# Patient Record
Sex: MALE | Race: BLACK OR AFRICAN AMERICAN | NOT HISPANIC OR LATINO | Employment: FULL TIME | URBAN - METROPOLITAN AREA
[De-identification: names, ages, dates, MRNs, and addresses within clinical notes are randomized per-mention and may not be internally consistent; named-entity substitution may affect disease eponyms.]

---

## 2018-07-18 ENCOUNTER — HOSPITAL ENCOUNTER (EMERGENCY)
Facility: OTHER | Age: 63
Discharge: HOME | End: 2018-07-18
Attending: EMERGENCY MEDICINE
Payer: MEDICAID

## 2018-07-18 VITALS
HEART RATE: 72 BPM | OXYGEN SATURATION: 99 % | SYSTOLIC BLOOD PRESSURE: 129 MMHG | WEIGHT: 152 LBS | HEIGHT: 71 IN | TEMPERATURE: 98 F | RESPIRATION RATE: 18 BRPM | DIASTOLIC BLOOD PRESSURE: 76 MMHG | BODY MASS INDEX: 21.28 KG/M2

## 2018-07-18 DIAGNOSIS — S29.019A THORACIC MYOFASCIAL STRAIN, INITIAL ENCOUNTER: Primary | ICD-10-CM

## 2018-07-18 DIAGNOSIS — R10.9 RIGHT FLANK PAIN: ICD-10-CM

## 2018-07-18 DIAGNOSIS — R25.8 CLONUS: ICD-10-CM

## 2018-07-18 DIAGNOSIS — M25.551 HIP PAIN, RIGHT: ICD-10-CM

## 2018-07-18 LAB
ALBUMIN SERPL BCP-MCNC: 3.9 G/DL
ALP SERPL-CCNC: 48 U/L
ALT SERPL W/O P-5'-P-CCNC: 10 U/L
ANION GAP SERPL CALC-SCNC: 5 MMOL/L
AST SERPL-CCNC: 14 U/L
BASOPHILS # BLD AUTO: 0.02 K/UL
BASOPHILS NFR BLD: 0.4 %
BILIRUB SERPL-MCNC: 0.7 MG/DL
BILIRUB UR QL STRIP: NEGATIVE
BUN SERPL-MCNC: 12 MG/DL
CALCIUM SERPL-MCNC: 9.4 MG/DL
CHLORIDE SERPL-SCNC: 105 MMOL/L
CLARITY UR: CLEAR
CO2 SERPL-SCNC: 28 MMOL/L
COLOR UR: YELLOW
CREAT SERPL-MCNC: 1.1 MG/DL
DIFFERENTIAL METHOD: ABNORMAL
EOSINOPHIL # BLD AUTO: 0.1 K/UL
EOSINOPHIL NFR BLD: 2.3 %
ERYTHROCYTE [DISTWIDTH] IN BLOOD BY AUTOMATED COUNT: 13 %
EST. GFR  (AFRICAN AMERICAN): >60 ML/MIN/1.73 M^2
EST. GFR  (NON AFRICAN AMERICAN): >60 ML/MIN/1.73 M^2
GLUCOSE SERPL-MCNC: 93 MG/DL
GLUCOSE UR QL STRIP: NEGATIVE
HCT VFR BLD AUTO: 37.7 %
HGB BLD-MCNC: 12 G/DL
HGB UR QL STRIP: NEGATIVE
KETONES UR QL STRIP: NEGATIVE
LEUKOCYTE ESTERASE UR QL STRIP: NEGATIVE
LYMPHOCYTES # BLD AUTO: 1.9 K/UL
LYMPHOCYTES NFR BLD: 36.6 %
MAGNESIUM SERPL-MCNC: 2.2 MG/DL
MCH RBC QN AUTO: 29.2 PG
MCHC RBC AUTO-ENTMCNC: 31.8 G/DL
MCV RBC AUTO: 92 FL
MONOCYTES # BLD AUTO: 0.7 K/UL
MONOCYTES NFR BLD: 13.1 %
NEUTROPHILS # BLD AUTO: 2.5 K/UL
NEUTROPHILS NFR BLD: 47.4 %
NITRITE UR QL STRIP: NEGATIVE
PH UR STRIP: 6 [PH] (ref 5–8)
PHOSPHATE SERPL-MCNC: 3.4 MG/DL
PLATELET # BLD AUTO: 223 K/UL
PMV BLD AUTO: 8.7 FL
POTASSIUM SERPL-SCNC: 4.3 MMOL/L
PROT SERPL-MCNC: 7.5 G/DL
PROT UR QL STRIP: NEGATIVE
RBC # BLD AUTO: 4.11 M/UL
SODIUM SERPL-SCNC: 138 MMOL/L
SP GR UR STRIP: 1.02 (ref 1–1.03)
URN SPEC COLLECT METH UR: NORMAL
UROBILINOGEN UR STRIP-ACNC: 1 EU/DL
WBC # BLD AUTO: 5.27 K/UL

## 2018-07-18 PROCEDURE — A9585 GADOBUTROL INJECTION: HCPCS | Performed by: EMERGENCY MEDICINE

## 2018-07-18 PROCEDURE — 25500020 PHARM REV CODE 255: Performed by: EMERGENCY MEDICINE

## 2018-07-18 PROCEDURE — 80053 COMPREHEN METABOLIC PANEL: CPT

## 2018-07-18 PROCEDURE — 83735 ASSAY OF MAGNESIUM: CPT

## 2018-07-18 PROCEDURE — 99284 EMERGENCY DEPT VISIT MOD MDM: CPT | Mod: 25

## 2018-07-18 PROCEDURE — 85025 COMPLETE CBC W/AUTO DIFF WBC: CPT

## 2018-07-18 PROCEDURE — 81003 URINALYSIS AUTO W/O SCOPE: CPT

## 2018-07-18 PROCEDURE — 84100 ASSAY OF PHOSPHORUS: CPT

## 2018-07-18 RX ORDER — METHOCARBAMOL 500 MG/1
1000 TABLET, FILM COATED ORAL 3 TIMES DAILY PRN
Qty: 30 TABLET | Refills: 0 | Status: SHIPPED | OUTPATIENT
Start: 2018-07-18 | End: 2018-07-23

## 2018-07-18 RX ORDER — GADOBUTROL 604.72 MG/ML
7 INJECTION INTRAVENOUS
Status: COMPLETED | OUTPATIENT
Start: 2018-07-18 | End: 2018-07-18

## 2018-07-18 RX ORDER — IBUPROFEN 600 MG/1
600 TABLET ORAL EVERY 6 HOURS PRN
Qty: 20 TABLET | Refills: 0 | Status: SHIPPED | OUTPATIENT
Start: 2018-07-18 | End: 2020-07-27 | Stop reason: SDUPTHER

## 2018-07-18 RX ADMIN — GADOBUTROL 7 ML: 604.72 INJECTION INTRAVENOUS at 12:07

## 2018-07-18 NOTE — ED NOTES
Lab called, per Adeline, reports urine must be recollected due to contamination from urine in bag.

## 2018-07-18 NOTE — ED NOTES
Pt presents to ED with complaints of R sided pain x1 week. Pt reports playing basketball when pain began. Pt reports pain at 9/10, worsens with movement, reports radiation to R leg. Pt also reports urinary frequency, denies any pain while voiding, foul-smelling urine. AAOx4, RR even and unlabored. Will continue to monitor.

## 2018-07-18 NOTE — ED PROVIDER NOTES
"Encounter Date: 7/18/2018    SCRIBE #1 NOTE: I, Roxi Wynne, am scribing for, and in the presence of, Dr. Hargrove.       History     Chief Complaint   Patient presents with    Flank Pain     Pt reports R sided pain x1 week. Pt reports pain starting after playing basketball. Pain worsens with movement. Pt denies any urinary difficulty.      Time seen by provider: 10:08 AM    This is a 62 y.o. male who presents with complaint of right flank pain that began while playing basketball one week ago. He denies fall or trauma. He additionally reports intermittent urinary incontinence, right arm pain, "sharp" 8-9/10 right leg pain, and right leg shaking and weakness that began five days ago. He denies fever, SOB, chest pain, abdominal pain, N/V/D, dysuria, or penile or numbness. He has taken Advil and Aleve with no relief. He reports occasional use of alcohol on the weekend and use of marijuana, but denies use of illicit drugs.      The history is provided by the patient.     Review of patient's allergies indicates:  No Known Allergies  Past Medical History:   Diagnosis Date    Pneumonia      Past Surgical History:   Procedure Laterality Date    KNEE ARTHROSCOPY       No family history on file.  Social History   Substance Use Topics    Smoking status: Current Every Day Smoker     Packs/day: 2.00     Types: Cigarettes    Smokeless tobacco: Never Used    Alcohol use Yes      Comment: Socially     Review of Systems   Constitutional: Negative for chills and fever.   HENT: Negative for congestion and sore throat.    Eyes: Negative for visual disturbance.   Respiratory: Negative for cough and shortness of breath.    Cardiovascular: Negative for chest pain and palpitations.   Gastrointestinal: Negative for abdominal pain, diarrhea, nausea and vomiting.   Genitourinary: Positive for flank pain. Negative for decreased urine volume, dysuria, frequency, penile pain, penile swelling, scrotal swelling and testicular pain.        " Negative for penile numbness.  Positive for urinary incontinence.   Musculoskeletal: Negative for back pain, joint swelling, myalgias, neck pain and neck stiffness.        Positive for arm and leg pain.   Skin: Negative for color change, rash and wound.   Neurological: Positive for tremors and weakness. Negative for dizziness, light-headedness, numbness and headaches.   Psychiatric/Behavioral: Negative for behavioral problems and confusion.       Physical Exam     Initial Vitals [07/18/18 0909]   BP Pulse Resp Temp SpO2   127/78 78 18 97.6 °F (36.4 °C) 99 %      MAP       --         Physical Exam    Nursing note and vitals reviewed.  Constitutional: He appears well-developed and well-nourished. He is not diaphoretic. No distress.   HENT:   Head: Normocephalic and atraumatic.   Mouth/Throat: Oropharynx is clear and moist. No oropharyngeal exudate, posterior oropharyngeal edema or posterior oropharyngeal erythema.   Eyes: Conjunctivae and EOM are normal. Pupils are equal, round, and reactive to light. No scleral icterus.   Neck: Normal range of motion. Neck supple.   Cardiovascular: Normal rate, regular rhythm and normal heart sounds. Exam reveals no gallop and no friction rub.    No murmur heard.  Pulmonary/Chest: No respiratory distress. He has no wheezes. He has no rhonchi. He has no rales.   Breath sounds are distant, but normal.   Abdominal: Soft. Bowel sounds are normal. He exhibits no distension. There is no tenderness. There is no rebound and no guarding.   Musculoskeletal: Normal range of motion. He exhibits no edema or tenderness.   Tenderness to palpation right CVA area.  Painful range of motion with external rotation of right hip.   Neurological: He is alert and oriented to person, place, and time. He has normal strength. No cranial nerve deficit or sensory deficit.   2 beats of clonus on right ankle. None on the left ankle.   Skin: Skin is warm and dry. No rash noted. No pallor.   Psychiatric: He has a  normal mood and affect. Thought content normal.         ED Course   Procedures  Labs Reviewed   CBC W/ AUTO DIFFERENTIAL - Abnormal; Notable for the following:        Result Value    RBC 4.11 (*)     Hemoglobin 12.0 (*)     Hematocrit 37.7 (*)     MCHC 31.8 (*)     MPV 8.7 (*)     All other components within normal limits   COMPREHENSIVE METABOLIC PANEL - Abnormal; Notable for the following:     Alkaline Phosphatase 48 (*)     Anion Gap 5 (*)     All other components within normal limits   URINALYSIS   MAGNESIUM   PHOSPHORUS          Imaging Results          X-Ray Hip 2 View Right (Final result)  Result time 07/18/18 13:47:23    Final result by Sy Blount MD (07/18/18 13:47:23)                 Impression:      No fracture or acute abnormality.  Minimal DJD of the hips.      Electronically signed by: Sy Blount MD  Date:    07/18/2018  Time:    13:47             Narrative:    EXAMINATION:  XR HIP 2 VIEW RIGHT    CLINICAL HISTORY:  Pain in right hip    TECHNIQUE:  AP view of the pelvis and frog leg lateral view of the right hip were performed.    COMPARISON:  None    FINDINGS:  The skeletal structures are intact.  No fracture or dislocation is identified.  Hip joint spaces are minimally narrowed.  Accessory ossicles are noted along each acetabulum.    Visualized abdomen shows prominent bowel gas without abnormal distention.                               X-Ray Chest PA And Lateral (Final result)  Result time 07/18/18 13:45:50    Final result by Sy Blount MD (07/18/18 13:45:50)                 Impression:      No acute cardiopulmonary disease.  Possible emphysema.      Electronically signed by: Sy Blount MD  Date:    07/18/2018  Time:    13:45             Narrative:    EXAMINATION:  XR CHEST PA AND LATERAL    CLINICAL HISTORY:  Unspecified abdominal pain    TECHNIQUE:  PA and lateral views of the chest were performed.    COMPARISON:  None    FINDINGS:  The heart size and mediastinal  contour are normal.  Lungs are well expanded possibly with emphysema.  Lungs are clear without acute consolidation or pleural fluid.  Skeletal structures are intact.  The visualized upper abdomen is unremarkable.                               MRI Lumbar Spine W WO Cont (Final result)  Result time 07/18/18 13:23:44   Procedure changed from MRI Lumbar Spine With Contrast     Final result by Mu Carvajal DO (07/18/18 13:23:44)                 Impression:      Multilevel degenerative change lumbar spine most pronounced at L 3/L4 with small posterior disc osteophyte complex with superimposed left paracentral disc protrusion with annular fissure.  No significant central canal stenosis with superimposed facet joint arthropathy with moderate bilateral neural foraminal stenosis.    Please see above for additional details.      Electronically signed by: Mu Carvajal DO  Date:    07/18/2018  Time:    13:23             Narrative:    EXAMINATION:  MRI LUMBAR SPINE W WO CONTRAST    CLINICAL HISTORY:  Low back pain, cauda equina syndrome suspected; Pain in right hip    TECHNIQUE:  Sagittal T1, T2, stir and axial T1-T2 imaging of the lumbar spine without contrast.  In addition axial T1 and sagittal fat sat T1 postcontrast imaging of the lumbar spine following 7 mL Gadavist intravenous infusion of contrast    COMPARISON:  None.    FINDINGS:  There is straightening of the normal lumbar lordosis.  The lumbar vertebral body heights, contours and bone marrow signal is within normal limits without evidence for acute fracture or subluxation.    .    The distal spinal cord and conus is normal in signal and contour tip of the conus approximates the T12/L1 level.    No aneurysmal dilatation of the visualized abdominal aorta.    T12/L1 through L1/L2: No significant disc bulge, central canal or neural foraminal stenosis.    L2/L3: No significant disc bulge central canal or neural foraminal stenosis.    L3/L4:Posterior disc osteophyte with  superimposed left paracentral disc protrusion with annular fissure.  Mass effect on the ventral thecal sac without significant central canal stenosis.  Superimposed facet joint arthropathy with moderate bilateral neural foraminal stenosis.    L4/L5:Posterior disc osteophyte with ligamentum flavum hypertrophy and facet joint arthropathy without significant central canal stenosis with mild moderate bilateral neural foraminal stenosis.    L5/S1: Posterior disc osteophyte without significant central canal stenosis with superimposed facet joint arthropathy with mild neural foraminal stenosis bilaterally    No abnormal intrathecal enhancement.                              X-Rays:   Independently Interpreted Readings:   Chest X-Ray: No infiltrate, effusion, or pneumothorax.   Other Readings:  Right Hip: Degenerative changes present without fracture or dislocation.    Medical Decision Making:   Clinical Tests:   Lab Tests: Ordered and Reviewed  Radiological Study: Ordered and Reviewed  ED Management:  Emergent evaluation of 62-year-old male with complaint of right flank pain since playing basketball, this is reproducible on exam.  Urinalysis does not show any pyelonephritis and chest x-ray shows no cause for his pain.  I suspect this is musculoskeletal.  However, on review of systems he had other concerning symptoms including some occasional urinary incontinence and hip weakness. On exam he had clonus in the right foot.  MRI was performed which does not show any cauda equina or cord compression, there is facet arthropathy and some foraminal stenosis.  Blood tests are benign and I am comfortable with discharge home.  He is discharged with prescriptions for ibuprofen and Robaxin, and encouraged close follow-up with PCP.  He does not currently have a PCP and is referred to the [facility].            Scribe Attestation:   Scribe #1: I performed the above scribed service and the documentation accurately describes the  services I performed. I attest to the accuracy of the note.    Attending Attestation:           Physician Attestation for Scribe:  Physician Attestation Statement for Scribe #1: I, Dr. Hargrove, reviewed documentation, as scribed by Roxi Wynne in my presence, and it is both accurate and complete.                    Clinical Impression:     1. Thoracic myofascial strain, initial encounter    2. Clonus    3. Hip pain, right    4. Right flank pain                                 Chelsey Hargrove MD  07/19/18 7055

## 2018-08-15 DIAGNOSIS — M54.41 LUMBAGO WITH SCIATICA, RIGHT SIDE: Primary | ICD-10-CM

## 2018-09-05 ENCOUNTER — CLINICAL SUPPORT (OUTPATIENT)
Dept: REHABILITATION | Facility: OTHER | Age: 63
End: 2018-09-05
Payer: MEDICAID

## 2018-09-05 DIAGNOSIS — R53.1 DECREASED STRENGTH, ENDURANCE, AND MOBILITY: ICD-10-CM

## 2018-09-05 DIAGNOSIS — R68.89 DECREASED STRENGTH, ENDURANCE, AND MOBILITY: ICD-10-CM

## 2018-09-05 DIAGNOSIS — M54.41 ACUTE RIGHT-SIDED LOW BACK PAIN WITH RIGHT-SIDED SCIATICA: ICD-10-CM

## 2018-09-05 DIAGNOSIS — M62.89 MUSCLE TIGHTNESS: ICD-10-CM

## 2018-09-05 DIAGNOSIS — Z74.09 DECREASED STRENGTH, ENDURANCE, AND MOBILITY: ICD-10-CM

## 2018-09-05 PROCEDURE — 97161 PT EVAL LOW COMPLEX 20 MIN: CPT | Mod: PN

## 2018-09-05 NOTE — PLAN OF CARE
"  TIME RECORD    Date: 09/05/2018    Start Time:  11:50  Stop Time:  12:50  Total Timed Minutes:  60 min    OUTPATIENT PHYSICAL THERAPY   PATIENT EVALUATION  Onset Date: July 2018  Primary Diagnosis:   1. Acute right-sided low back pain with right-sided sciatica     2. Muscle tightness     3. Decreased strength, endurance, and mobility       Treatment Diagnosis: Low back and RLE pain, decreased ROM, flexibility, strength, poor postural awareness/endurance, decreased NM control/coordination  Past Medical History:   Diagnosis Date    Pneumonia      Precautions: Hx R knee scope. Hx smoking/alcohol use  Prior Therapy: none for c/c  Medications: Linden Spencer has a current medication list which includes the following prescription(s): ibuprofen.  Nutrition:  Thin  History of Present Illness: acute onset  Prior Level of Function: Independent  Social History: works at [employer] - rides bike. 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 Recreation -basketball  Place of Residence (Steps/Adaptations): lives alone. 4 steps to front door, [facility]  Functional Deficits Leading to Referral/Nature of Injury: pain and difficulty with ADLs, HHCs, work-related and recreational activites  Patient Therapy Goals: "reduce pain, get back to playing basketball and being able to work without limping"    Subjective     Linden Spencer states that he was playing basketball with his grandchildren in July 2018 when he felt a pull in the R low back. He says that since then he has had R back pain as well as pain down the posterior-lateral R LE (described as numbness/tingling). He says that his symptoms are staying the same since onset. Has tried medication but notes Min change.    Pain:  Location: R low back, RLE  Description: Aching, Dull, Tingling and Numb  Activities Which Increase Pain: Sitting, Bending, Walking, Night Time, Getting out of bed/chair and disrupts sleep, ADLs, HHCs, " "work-related activities, recreational activites  Activities Which Decrease Pain: alleve, change positions  Pain Scale: 4/10 at best 4/10 now  8/10 at worst    Objective     Palpation: increased tone/tenderness to the R LSP paraspinals, QL, glute max/piriformis. glute atrophy noted Norm  Postural examination/scapula alignment: Affected scapula elevated and Decreased lordosis  Sensory deficit: none  Reflexes: intact    Thoracic/Lumbar AROM: Pain/Dysfunction with Movement:   Flexion Min garrido, endrange stiffness in R LB/HS  Repeated standing: improved symptoms   Extension Mod garrido, endrange pain  Repeated standing: mild worsening   Right side bending Min garrido, no pain   Left side bending Min-Mod garrido, pain in R trunk   Right rotation Standing - WFL, no pain  Sitting - min garrido, no pain   Left rotation Standing - WFL, no pain  Sitting - min garrido, no pain     Hip ROM: WFL. MOD Limited IR/ER of R hip  Knee ROM: WNL  Lower Extremity Strength  Right LE  Left LE    Hip flexion: 4/5 Hip flexion: 4+/5   Hip extension:  4/5 Hip extension: 4+/5   Hip abduction: 4/5 Hip abduction: 4/5   Hip adduction:  5/5 Hip adduction:  5/5   Hip Internal rotation   4+/5 Hip Internal rotation 4+/5   Knee Flexion 5/5 Knee Flexion 5/5   Knee Extension 5/5 Knee Extension 5/5   Ankle dorsiflexion: 5/5 Ankle dorsiflexion: 5/5   Ankle plantarflexion: 5/5 Ankle plantarflexion: 5/5     Flexibility:   Hamstrings: R = mod garrido, L = Min garrido  Piriformis: R = mod garrido, L = Min garrido  Hip flexors / Quads = WFL  Joint Mobility: NT today. Assess next visit    Special Tests:   Test Name  Test Result   Prone Instability Test NT   Lumbar Quadrant test (--)   Straight Leg Raise (--)   Neural Tension Test (Slump) (+)   Crossed Straight Leg Raise (--)   Walking on toes (--)   Walking on heels  (--)   Clonus (--)   NATHAN (--)   FADIR (+)    SI Joint Provocation Test (compression / distraction) NT   Balance: SLS R = 10" with increased sway and ipsilat trunk lean  L = 10" w/ Min " "sway     Gait: Without AD  Analysis: Assistance - none. Antalgic with decreased WB'ing through the RLE  Bed Mobility:Independent  Transfers: Independent    Other: FOTO limitation = 44% disability  Examination time: 20 min  Treatment:  LTR 15x  SKTC: 3 x 20"  AHSS: 15x  Figure 4 piriformis stretch: 3 x 20"  SLR: 2 x10  Bridge with band: 2 x 10 x Yellow TB  Clams: 2 x 10 x Yellow TB    Patient education: Patient educated regarding pathogenesis, diagnosis, protocol, prognosis, POC, and HEP. Written Home Exercises Provided with written and verbal instructions for frequency and duration of the following exercises: see clinical reference tab for patient instructions. Yellow theraband provided today. Pt educated on HEP and activity modifications to reduce c/o pain and improve overall function. Pt was educated in posture and body mechanics. Pt also educated on use of modalities prn to reduce c/o pain and dysfunction. Patient demo good understanding of the education provided. Patient demo good return demo of skill of exercises.    Assessment     Initial Assessment (Pertinent finding, problem list and factors affecting outcome): Patient presents with right low back and leg pain with reduced ROM, flexibility, and strength, with s/s associated with muscle strain. Current impairments limits patient with all functional activities. Patient requires skilled PT to address remaining deficits and return patient to OF. Pt has set realistic goals and has verbalized good understanding and agreement with reported diagnosis, prognosis and treatment. Pt demonstrates no additional cultural, spiritual or educational need and currently has no barriers to learning.     Rehab Potiential: good     Medical necessity is demonstrated by the following problem list.  Pt presents with the following impairments:     History  Co-morbidities and personal factors that may impact the plan of care Examination  Body Structures and Functions, activity " limitations and participation restrictions that may impact the plan of care Clinical Presentation   Decision Making/ Complexity Score     Co-morbidities:   advanced age, education level, excessive commute time/distance, financial considerations and Hx R knee scope. Hx smoking/alcohol use    Hx R knee scope. Hx smoking/alcohol use            Personal Factors:   age  education level  coping style  social background  lifestyle Body Regions: trunk, back, BLE    Body Systems: Musculoskeletal (ROM, strength, symmetry, joint mobility, soft tissue or myofascial mobility, flexibility), Neuromuscular (postural alignment, body mechanics, balance, gait, transfers, motor control, motor learning), cardiovascular (endurance), Integumentary (skin integrity)    Activity limitations:   Learning and applying knowledge  no deficits    General Tasks and Commands  no deficits    Communication  no deficits    Mobility  lifting and carrying objects  walking  moving around using equipment (WC)  driving (bike, car, motorcycle)    Self care  toileting  dressing  looking after one's health    Domestic Life  shopping  cooking  doing house work (cleaning house, washing dishes, laundry)  assisting others    Interactions/Relationships  no deficits    Life Areas  employment    Community and Social Life  community life  recreation and leisure  Christianity and spirituality      Participation Restrictions:   Sitting, Bending, Walking, Night Time, Getting out of bed/chair and disrupts sleep, ADLs, HHCs, work-related activities, recreational activites       Stable and uncomplicated         Low        FOTO: 44% disability     Short Term Goals (4 Weeks):   1. Pt will report 20% reduction in pain of the lumbar spine and LE for ease with ADL's  2. PT will demonstrate improved trunk strength by a half grade in for ease with upright posture during standing activities.  3. Pt will demonstrate improved lumbar spine ROM in all directions by a half grade for ease  with bending activities.   4. Pt to demonstrate improved functional ability with FOTO limitation <=34% disability.    Long Term Goals (8 Weeks):   1. Pt will report being independent with HEP for maintenance of improvements gained during therapy sessions  2. PT will report 50% reduction of pain of the back and LE for ease with dressing and grooming activities.   3. Pt will demonstrate trunk and extremity strength to >=4+/5 without the provocation of pain for ease with household chores  4. Pt will demonstrate appropriate upright posture without external cueing for ease with work related activities.   5. Pt to demonstrate improved functional ability with FOTO limitation <=24% disability.      Plan     Certification Period: 9/5/18 to 12/5/18  Recommended Treatment Plan: 1-2 times per week for 6-8 weeks, pending pt progress: Aquatic Therapy, Cervical/Lumbar Traction, Electrical Stimulation prn, Gait Training, Iontophoresis (with dexamethasone prn), Manual Therapy, Moist Heat/ Ice, Neuromuscular Re-ed, Patient Education, Self Care, Therapeutic Activites, Therapeutic Exercise and IASTYM, therapeutic taping, dry needling  Other Recommendations: Progress HEP towards D/C. Recommend F/U with MD if symptoms worsen or do not resolve. Patient may be seen by a PTA for treatment to carry out their plan of care.  Face-to-face conferences will be held.          Therapist: Asha Mascorro, PT    I CERTIFY THE NEED FOR THESE SERVICES FURNISHED UNDER THIS PLAN OF TREATMENT AND WHILE UNDER MY CARE    Physician's comments: ________________________________________________________________________________________________________________________________________________      Physician's Name: ___________________________________

## 2018-09-17 ENCOUNTER — CLINICAL SUPPORT (OUTPATIENT)
Dept: REHABILITATION | Facility: OTHER | Age: 63
End: 2018-09-17
Payer: MEDICAID

## 2018-09-17 DIAGNOSIS — M54.41 ACUTE RIGHT-SIDED LOW BACK PAIN WITH RIGHT-SIDED SCIATICA: ICD-10-CM

## 2018-09-17 DIAGNOSIS — Z74.09 DECREASED STRENGTH, ENDURANCE, AND MOBILITY: ICD-10-CM

## 2018-09-17 DIAGNOSIS — R53.1 DECREASED STRENGTH, ENDURANCE, AND MOBILITY: ICD-10-CM

## 2018-09-17 DIAGNOSIS — M62.89 MUSCLE TIGHTNESS: ICD-10-CM

## 2018-09-17 DIAGNOSIS — R68.89 DECREASED STRENGTH, ENDURANCE, AND MOBILITY: ICD-10-CM

## 2018-09-17 PROCEDURE — 97110 THERAPEUTIC EXERCISES: CPT | Mod: PN

## 2018-09-17 NOTE — PROGRESS NOTES
"                            Physical Therapy Daily Treatment Note     Name: Cheri Sepncer  Clinic Number: [id number]    Therapy Diagnosis:   Encounter Diagnoses   Name Primary?    Acute right-sided low back pain with right-sided sciatica     Muscle tightness     Decreased strength, endurance, and mobility      Physician: Martínez Gibbs MD    Visit Date: 9/17/2018  Physician Orders: PT eval and treat  Medical Diagnosis: M54.41 (ICD-10-CM) - Lumbago with sciatica, right side  Evaluation Date: 9/5/18  Authorization Period Expiration: 12/31/18  Plan of Care Certification Period: 12/5/18  Visit #/Visits authorized: 2/ 20 (based on medical necessity)    Time In: 4:55 pm  Time Out: 5:58 pm  Total Billable Time: 53 minutes    Precautions: Hx R knee scope. Hx smoking/alcohol use    Subjective   Pt reports today is a "rough day."  He was not compliant with home exercise program. Has not performed HEP the last 2 days.  Response to previous treatment: felt better after last session  Functional change: none noted to date    Pain: 8/10  Location: back  bilateral    Objective   Arrived to therapy in pelvic dysfunction. R anterior rotated innominate, L posterior rotated innominate      CHERI received therapeutic exercises to develop strength, ROM, flexibility, posture and core stabilization for 53 minutes including:  Push/pull 3 x 3"  Supine active hamstring stretch 10 x 10"  Piriformis stretch 10 x 10"  Gluteal set 10 x 10"  Bridging 10 x 10"  Side lying clams 10 x 10"  Posterior pelvic tilt 10 x 10"  LTR 10 x 10"  Prone hip extension 10 x 10"    Second trial push pull 3 x 3"      Home Exercises Provided and Patient Education Provided     Education provided:   Push/pull  Gluteal sets  Side lying clams  Side lying hip abd   Posterior pelvic tilts  Prone hip extension      Written Home Exercises Provided: yes.  Exercises were reviewed and CHERI was able to demonstrate them prior to the end of the session.  CHERI demonstrated " good  understanding of the education provided.     See EMR under Patient Instructions for exercises provided 9/17/2018.    Assessment   Arrived to therapy in pelvic dysfunction. Push/pull able to correct alignment. Performed exercises and remained in proper pelvic alignment. Added to HEP.    CHERI is progressing well towards his goals.   Pt prognosis is Good.     Pt will continue to benefit from skilled outpatient physical therapy to address the deficits listed in the problem list box on initial evaluation, provide pt/family education and to maximize pt's level of independence in the home and community environment.     Pt's spiritual, cultural and educational needs considered and pt agreeable to plan of care and goals.    Anticipated barriers to physical therapy: none    Goals:   Short Term Goals (4 Weeks):   1. Pt will report 20% reduction in pain of the lumbar spine and LE for ease with ADL's  2. PT will demonstrate improved trunk strength by a half grade in for ease with upright posture during standing activities.  3. Pt will demonstrate improved lumbar spine ROM in all directions by a half grade for ease with bending activities.   4. Pt to demonstrate improved functional ability with FOTO limitation <=34% disability.     Long Term Goals (8 Weeks):   1. Pt will report being independent with HEP for maintenance of improvements gained during therapy sessions  2. PT will report 50% reduction of pain of the back and LE for ease with dressing and grooming activities.   3. Pt will demonstrate trunk and extremity strength to >=4+/5 without the provocation of pain for ease with household chores  4. Pt will demonstrate appropriate upright posture without external cueing for ease with work related activities.   5. Pt to demonstrate improved functional ability with FOTO limitation <=24% disability.      Plan     Continue with pelvic stabilization exercises and progress as tolerated by patient.    Jann Kirkland, PT

## 2018-09-17 NOTE — PATIENT INSTRUCTIONS
"SI joint Muscle energy for L Posterior/R anterior rotation      Copyright © 2177-4544 [employer]        Bring both knees up towards your chest as shown in the picture.  Place your Left hand on top of your Left thigh, and your Right hand on the back of your Right thigh.  Push your legs into each hand with about 50% effort.  Perform 3 reps and hold for 3 seconds. Perform twice a day.    Supine Active Hamstring Stretch        Grasp behind knee and keep leg at arms length. Extend leg up until a gentle stretch is found behind your knee.     The opposite knee can be bent or straight at your therapists discretion. Perform 10 reps holding for 10 seconds. Perform twice a day.    Copyright © 1714-3416 [employer]      Hip External Rotation - Piriformis Stretching            - Lie on your back, one knee bent and the other straight  - Grab the bent leg behind the knee with the opposite arm and pull the bent knee towards your opposite armpit  - The stretch may be felt in the outside buttock region or relatively deep in the pelvis.    The opposite knee can be bent or straight at your therapists discretion. Perform 10 reps holding for 10 seconds.    Perform twice a day.    Copyright © 6427-8536 [employer]      GLUTE SET - SUPINE        While lying on your back, squeeze your buttocks and hold. Repeat. The opposite knee can be bent or straight at your therapists discretion. Perform 10 reps holding for 10 seconds.    Perform twice a day.    Copyright © 7470-6257 [employer]    BRIDGING        While lying on your back, tighten your lower abdominals, squeeze your buttocks and then raise your buttocks off the floor/bed as creating a "Bridge" with your body. Hold and then lower yourself and repeat. Perform 10 reps holding for 10 seconds.    Perform twice a day.    Copyright © 5293-1414 [employer]      CLAM SHELLS      While lying on your side with your knees bent, draw up the top knee while keeping contact of your feet together.    Do " not let your pelvis roll back during the lifting movement.    Hold and then lower yourself and repeat. Perform 10 reps holding for 10 seconds.    Perform twice a day.    Copyright © 0441-9539 [employer]    HIP ABDUCTION - SIDELYING            While lying on your side, slowly raise up your top leg to the side. Keep your knee straight and maintain your toes pointed forward the entire time. Keep your leg in-line with your body.  The bottom leg can be bent to stabilize your body.    Hold and then lower yourself and repeat. Perform 10 reps holding for 10 seconds.    Copyright © 6442-3309 [employer]    POSTERIOR PELVIC TILT        Lie on your back on a firm surface with knees comfortably bent (top picture).  Then flatten back against the table while stas abdominal muscles as if pulling belly button toward ribs (bottom picture). Perform  10  Reps, hold for  10  Seconds, perform 2  times a day.    Copyright © 0141-5559 [employer]      LOWER TRUNK ROTATIONS - LTR          Lying on your back with your knees bent, gently move your knees side-to-side. Perform 10  Reps, hold for  10  Seconds, perform 2  times a day.    Copyright © 4838-6903 [employer]    PRONE HIP EXTENSION      While lying face down with your knee straight, slowly raise up leg off the ground. Maintain a straight knee the entire time.     Copyright © 4971-7075 [employer]

## 2018-09-19 ENCOUNTER — CLINICAL SUPPORT (OUTPATIENT)
Dept: REHABILITATION | Facility: OTHER | Age: 63
End: 2018-09-19
Payer: MEDICAID

## 2018-09-19 DIAGNOSIS — M62.89 MUSCLE TIGHTNESS: ICD-10-CM

## 2018-09-19 DIAGNOSIS — R68.89 DECREASED STRENGTH, ENDURANCE, AND MOBILITY: ICD-10-CM

## 2018-09-19 DIAGNOSIS — Z74.09 DECREASED STRENGTH, ENDURANCE, AND MOBILITY: ICD-10-CM

## 2018-09-19 DIAGNOSIS — M54.41 ACUTE RIGHT-SIDED LOW BACK PAIN WITH RIGHT-SIDED SCIATICA: ICD-10-CM

## 2018-09-19 DIAGNOSIS — R53.1 DECREASED STRENGTH, ENDURANCE, AND MOBILITY: ICD-10-CM

## 2018-09-19 PROCEDURE — 97110 THERAPEUTIC EXERCISES: CPT | Mod: PN | Performed by: PHYSICAL THERAPIST

## 2018-09-19 NOTE — PROGRESS NOTES
"                            Physical Therapy Daily Treatment Note     Name: Cheri Spencer  Clinic Number: [id number]    Therapy Diagnosis:   Encounter Diagnoses   Name Primary?    Acute right-sided low back pain with right-sided sciatica     Muscle tightness     Decreased strength, endurance, and mobility      Physician: Martínez Gibbs MD    Visit Date: 9/19/2018  Physician Orders: PT eval and treat  Medical Diagnosis: M54.41 (ICD-10-CM) - Lumbago with sciatica, right side  Evaluation Date: 9/5/18  Authorization Period Expiration: 12/31/18  Plan of Care Certification Period: 12/5/18  Visit #/Visits authorized: 3/ 20 (based on medical necessity)    Time In: 4:45 pm  Time Out: 5:30 pm  Total Billable Time: 45 minutes    Precautions: Hx R knee scope. Hx smoking/alcohol use    Subjective   Pt reports feeling better after last treatment. His pain is in the right leg and will start "jumping". He went back to the Dr. On Monday and was prescribed higher dose of tylenol.   He was not compliant with home exercise program. Has not performed HEP the last 2 days.  Response to previous treatment: felt better after last session  Functional change: none noted to date    Pain: 6/10  Location: back  bilateral    Objective   Arrived to therapy in pelvic dysfunction. R anterior rotated innominate, L posterior rotated innominate      CHERI received therapeutic exercises to develop strength, ROM, flexibility, posture and core stabilization for 45 minutes including:    Recumbent bike 5 min    Push/pull 3 x 3"  Supine active hamstring stretch 10 x 10"  Piriformis stretch 10 x 10"  Gluteal set 10 x 10"  Bridging 10 x 10"  Side lying clams 10 x 10"  Posterior pelvic tilt 10 x 10"  LTR 10 x 10"  Prone hip extension 10 x 10"  HL hip adduction with tva activation 10" x 10  Seated sciatic nerve glides R x 20    Gait training, VC's for heel toe    Home Exercises Provided and Patient Education Provided     Education provided: "   Push/pull  Gluteal sets  Side lying clams  Side lying hip abd   Posterior pelvic tilts  Prone hip extension    Written Home Exercises Provided: yes.  Exercises were reviewed and CHERI was able to demonstrate them prior to the end of the session.  CHERI demonstrated good  understanding of the education provided.     See EMR under Patient Instructions for exercises provided 9/17/2018.    Assessment   Continues with right anterior rotated innominate. Good response to muscle energy technique with correction of alignment.     CHERI is progressing well towards his goals.   Pt prognosis is Good.     Pt will continue to benefit from skilled outpatient physical therapy to address the deficits listed in the problem list box on initial evaluation, provide pt/family education and to maximize pt's level of independence in the home and community environment.     Pt's spiritual, cultural and educational needs considered and pt agreeable to plan of care and goals.    Anticipated barriers to physical therapy: none    Goals:   Short Term Goals (4 Weeks):   1. Pt will report 20% reduction in pain of the lumbar spine and LE for ease with ADL's  2. PT will demonstrate improved trunk strength by a half grade in for ease with upright posture during standing activities.  3. Pt will demonstrate improved lumbar spine ROM in all directions by a half grade for ease with bending activities.   4. Pt to demonstrate improved functional ability with FOTO limitation <=34% disability.     Long Term Goals (8 Weeks):   1. Pt will report being independent with HEP for maintenance of improvements gained during therapy sessions  2. PT will report 50% reduction of pain of the back and LE for ease with dressing and grooming activities.   3. Pt will demonstrate trunk and extremity strength to >=4+/5 without the provocation of pain for ease with household chores  4. Pt will demonstrate appropriate upright posture without external cueing for ease with work  related activities.   5. Pt to demonstrate improved functional ability with FOTO limitation <=24% disability.      Plan     Continue with pelvic stabilization exercises and progress as tolerated by patient.    Yajaira Graham, PT

## 2018-09-21 NOTE — PROGRESS NOTES
"                            Physical Therapy Daily Treatment Note     Name: Cheri Spencer  Clinic Number: [id number]    Therapy Diagnosis:   No diagnosis found.  Physician: Martínez Gibbs MD    Visit Date: 9/24/2018  Physician Orders: PT eval and treat  Medical Diagnosis: M54.41 (ICD-10-CM) - Lumbago with sciatica, right side  Evaluation Date: 9/5/18  Authorization Period Expiration: 12/31/18  Plan of Care Certification Period: 12/5/18  Visit #/Visits authorized: 3/ 20 (based on medical necessity)    Time In: 4:45 pm  Time Out: 5:30 pm  Total Billable Time: 45 minutes    Precautions: Hx R knee scope. Hx smoking/alcohol use    Subjective   Pt reports feeling better after last treatment. His pain is in the right leg and will start "jumping". He went back to the Dr. On Monday and was prescribed higher dose of tylenol.   He was not compliant with home exercise program. Has not performed HEP the last 2 days.  Response to previous treatment: felt better after last session  Functional change: none noted to date    Pain: 6/10  Location: back  bilateral    Objective   Arrived to therapy in pelvic dysfunction. R anterior rotated innominate, L posterior rotated innominate      CHERI received therapeutic exercises to develop strength, ROM, flexibility, posture and core stabilization for 45 minutes including:    Recumbent bike 5 min    Push/pull 3 x 3"  Supine active hamstring stretch 10 x 10"  Piriformis stretch 10 x 10"  Gluteal set 10 x 10"  Bridging 10 x 10"  Side lying clams 10 x 10"  Posterior pelvic tilt 10 x 10"  LTR 10 x 10"  Prone hip extension 10 x 10"  HL hip adduction with tva activation 10" x 10  Seated sciatic nerve glides R x 20    Gait training, VC's for heel toe    Home Exercises Provided and Patient Education Provided     Education provided:   Push/pull  Gluteal sets  Side lying clams  Side lying hip abd   Posterior pelvic tilts  Prone hip extension    Written Home Exercises Provided: yes.  Exercises were " reviewed and CHERI was able to demonstrate them prior to the end of the session.  CHERI demonstrated good  understanding of the education provided.     See EMR under Patient Instructions for exercises provided 9/17/2018.    Assessment   Continues with right anterior rotated innominate. Good response to muscle energy technique with correction of alignment.     CHERI is progressing well towards his goals.   Pt prognosis is Good.     Pt will continue to benefit from skilled outpatient physical therapy to address the deficits listed in the problem list box on initial evaluation, provide pt/family education and to maximize pt's level of independence in the home and community environment.     Pt's spiritual, cultural and educational needs considered and pt agreeable to plan of care and goals.    Anticipated barriers to physical therapy: none    Goals:   Short Term Goals (4 Weeks):   1. Pt will report 20% reduction in pain of the lumbar spine and LE for ease with ADL's  2. PT will demonstrate improved trunk strength by a half grade in for ease with upright posture during standing activities.  3. Pt will demonstrate improved lumbar spine ROM in all directions by a half grade for ease with bending activities.   4. Pt to demonstrate improved functional ability with FOTO limitation <=34% disability.     Long Term Goals (8 Weeks):   1. Pt will report being independent with HEP for maintenance of improvements gained during therapy sessions  2. PT will report 50% reduction of pain of the back and LE for ease with dressing and grooming activities.   3. Pt will demonstrate trunk and extremity strength to >=4+/5 without the provocation of pain for ease with household chores  4. Pt will demonstrate appropriate upright posture without external cueing for ease with work related activities.   5. Pt to demonstrate improved functional ability with FOTO limitation <=24% disability.      Plan     Continue with pelvic stabilization exercises  and progress as tolerated by patient.    Asha Mascorro, PT

## 2018-09-24 ENCOUNTER — CLINICAL SUPPORT (OUTPATIENT)
Dept: REHABILITATION | Facility: OTHER | Age: 63
End: 2018-09-24
Payer: MEDICAID

## 2018-09-24 DIAGNOSIS — M54.41 ACUTE RIGHT-SIDED LOW BACK PAIN WITH RIGHT-SIDED SCIATICA: ICD-10-CM

## 2018-09-24 DIAGNOSIS — Z74.09 DECREASED STRENGTH, ENDURANCE, AND MOBILITY: ICD-10-CM

## 2018-09-24 DIAGNOSIS — R53.1 DECREASED STRENGTH, ENDURANCE, AND MOBILITY: ICD-10-CM

## 2018-09-24 DIAGNOSIS — R68.89 DECREASED STRENGTH, ENDURANCE, AND MOBILITY: ICD-10-CM

## 2018-09-24 DIAGNOSIS — M62.89 MUSCLE TIGHTNESS: ICD-10-CM

## 2018-09-24 PROCEDURE — 97110 THERAPEUTIC EXERCISES: CPT | Mod: PN | Performed by: PHYSICAL THERAPIST

## 2018-09-24 NOTE — PROGRESS NOTES
"                            Physical Therapy Daily Treatment Note     Name: Cheri Spencer  Clinic Number: [id number]    Therapy Diagnosis:   Encounter Diagnoses   Name Primary?    Acute right-sided low back pain with right-sided sciatica     Muscle tightness     Decreased strength, endurance, and mobility      Physician: Martínez Gibbs MD    Visit Date: 9/24/2018  Physician Orders: PT eval and treat  Medical Diagnosis: M54.41 (ICD-10-CM) - Lumbago with sciatica, right side  Evaluation Date: 9/5/18  Authorization Period Expiration: 12/31/18  Plan of Care Certification Period: 12/5/18  Visit #/Visits authorized: 4/ 20 (based on medical necessity)    Time In: 4:45 pm  Time Out: 6:00 pm  Total Billable Time: 55 minutes    Precautions: Hx R knee scope. Hx smoking/alcohol use    Subjective   Pt reports: feeling less stiff when he walks. He is riding his bike   Response to previous treatment: felt better after last session  Functional change: none noted to date    Pain: 6/10  Location: back  bilateral    Objective   Arrived to therapy in pelvic dysfunction. R anterior rotated innominate, L posterior rotated innominate      CHERI received therapeutic exercises to develop strength, ROM, flexibility, posture and core stabilization for 55 minutes including:    Upright bike 8 min    Push/pull 3 x 6" , twice  Supine active hamstring stretch 10 x 10"  Piriformis stretch 10 x 10"  Gluteal set 10 x 10"  Bridging 10 x 10"  Side lying clams 10 x 10"  Posterior pelvic tilt 10 x 10"  LTR 10 x 10"  Prone hip extension 10 x 10"  HL hip adduction with tva activation 10" x 10  Seated sciatic nerve glides R x 20    Gait training, VC's for heel toe    LLD 20" x 3 R    Pt received 10 min moist heat low back post RX for pain reduction    Home Exercises Provided and Patient Education Provided     Education provided:   Push/pull  Gluteal sets  Side lying clams  Side lying hip abd   Posterior pelvic tilts  Prone hip extension    Written Home " Exercises Provided: yes.  Exercises were reviewed and CHERI was able to demonstrate them prior to the end of the session.  CHERI demonstrated good  understanding of the education provided.     See EMR under Patient Instructions for exercises provided 9/17/2018.    Assessment   Pt tolerated treatment session well. Good response to LLD and Si MET with improvement in symptoms and gait following.     CHERI is progressing well towards his goals.   Pt prognosis is Good.     Pt will continue to benefit from skilled outpatient physical therapy to address the deficits listed in the problem list box on initial evaluation, provide pt/family education and to maximize pt's level of independence in the home and community environment.     Pt's spiritual, cultural and educational needs considered and pt agreeable to plan of care and goals.    Anticipated barriers to physical therapy: none    Goals:   Short Term Goals (4 Weeks):   1. Pt will report 20% reduction in pain of the lumbar spine and LE for ease with ADL's  2. PT will demonstrate improved trunk strength by a half grade in for ease with upright posture during standing activities.  3. Pt will demonstrate improved lumbar spine ROM in all directions by a half grade for ease with bending activities.   4. Pt to demonstrate improved functional ability with FOTO limitation <=34% disability.     Long Term Goals (8 Weeks):   1. Pt will report being independent with HEP for maintenance of improvements gained during therapy sessions  2. PT will report 50% reduction of pain of the back and LE for ease with dressing and grooming activities.   3. Pt will demonstrate trunk and extremity strength to >=4+/5 without the provocation of pain for ease with household chores  4. Pt will demonstrate appropriate upright posture without external cueing for ease with work related activities.   5. Pt to demonstrate improved functional ability with FOTO limitation <=24% disability.      Plan      Continue with pelvic stabilization exercises and progress as tolerated by patient.    Yajaira Graham, PT

## 2018-10-01 ENCOUNTER — CLINICAL SUPPORT (OUTPATIENT)
Dept: REHABILITATION | Facility: OTHER | Age: 63
End: 2018-10-01
Payer: MEDICAID

## 2018-10-01 DIAGNOSIS — R53.1 DECREASED STRENGTH, ENDURANCE, AND MOBILITY: ICD-10-CM

## 2018-10-01 DIAGNOSIS — Z74.09 DECREASED STRENGTH, ENDURANCE, AND MOBILITY: ICD-10-CM

## 2018-10-01 DIAGNOSIS — R68.89 DECREASED STRENGTH, ENDURANCE, AND MOBILITY: ICD-10-CM

## 2018-10-01 DIAGNOSIS — M54.41 ACUTE RIGHT-SIDED LOW BACK PAIN WITH RIGHT-SIDED SCIATICA: ICD-10-CM

## 2018-10-01 DIAGNOSIS — M62.89 MUSCLE TIGHTNESS: ICD-10-CM

## 2018-10-01 PROCEDURE — 97110 THERAPEUTIC EXERCISES: CPT | Mod: PN

## 2018-10-01 NOTE — PROGRESS NOTES
"                            Physical Therapy Daily Treatment Note     Name: Cheri Spencer  Clinic Number: [id number]    Therapy Diagnosis:   Encounter Diagnoses   Name Primary?    Acute right-sided low back pain with right-sided sciatica     Muscle tightness     Decreased strength, endurance, and mobility      Physician: Martínez Gibbs MD    Visit Date: 10/1/2018  Physician Orders: PT eval and treat  Medical Diagnosis: M54.41 (ICD-10-CM) - Lumbago with sciatica, right side  Evaluation Date: 9/5/18  Authorization Period Expiration: 12/31/18  Plan of Care Certification Period: 12/5/18  Visit #/Visits authorized: 5/ 20 (based on medical necessity)    Time In: 4:50 pm  Time Out: 5:50 pm  Total Billable Time: 60 minutes    Precautions: Hx R knee scope. Hx smoking/alcohol use    Subjective   Pt reports: that he did too much over the weekend notes increased pain down the leg today.    Response to previous treatment: felt better after last session  Functional change: none noted to date    Pain: 4-5/10  Location: back  bilateral    Objective   Arrived to therapy in pelvic dysfunction. R anterior rotated innominate, L posterior rotated innominate     10/1/18  Thoracic/Lumbar AROM: Pain/Dysfunction with Movement:   Flexion WNL, no pain  Repeated standing: better   Extension Min garrido, endrange stiffness on R LB.  Repeated standing: mild worsening   Right side bending WNL, no pain   Left side bending WNL garrido, pain in R trunk   Right rotation Standing - WFL, no pain  Sitting - WNL, no pain   Left rotation Standing - WFL, no pain  Sitting - min garrido, no pain         CHERI received therapeutic exercises to develop strength, ROM, flexibility, posture and core stabilization for 55 minutes including:    Upright bike 8 min    Push/pull 3 x 6" , twice  Supine active hamstring stretch 10 x 10"  Piriformis stretch 10 x 10"  Gluteal set 10 x 10"  Bridging 10 x 10"  Side lying clams 10 x 10"  Posterior pelvic tilt 10 x 10"  LTR 10 x " "10"  Prone hip extension 10 x 10"  HL hip adduction with tva activation 10" x 10  Seated sciatic nerve glides R x 20  +Narrow rows: 2 x 10 x GTB  +SALPD: 2 x 10 x GTB    Gait training, VC's for heel toe    LLD 20" x 3 R    Pt received 10 min moist heat low back post RX for pain reduction    Home Exercises Provided and Patient Education Provided     Education provided:   Push/pull  Gluteal sets  Side lying clams  Side lying hip abd   Posterior pelvic tilts  Prone hip extension    Written Home Exercises Provided: yes.  Exercises were reviewed and CHERI was able to demonstrate them prior to the end of the session.  CHERI demonstrated good  understanding of the education provided.     See EMR under Patient Instructions for exercises provided 9/17/2018.    Assessment   Pt tolerated treatment session well. Pt demo's marked improvements in trunk AROM, allowing for increased ease with ADLs.    CHERI is progressing well towards his goals.   Pt prognosis is Good.     Pt will continue to benefit from skilled outpatient physical therapy to address the deficits listed in the problem list box on initial evaluation, provide pt/family education and to maximize pt's level of independence in the home and community environment.     Pt's spiritual, cultural and educational needs considered and pt agreeable to plan of care and goals.    Anticipated barriers to physical therapy: none    Goals:   Short Term Goals (4 Weeks):   1. Pt will report 20% reduction in pain of the lumbar spine and LE for ease with ADL's  2. PT will demonstrate improved trunk strength by a half grade in for ease with upright posture during standing activities.  3. Pt will demonstrate improved lumbar spine ROM in all directions by a half grade for ease with bending activities.   4. Pt to demonstrate improved functional ability with FOTO limitation <=34% disability.     Long Term Goals (8 Weeks):   1. Pt will report being independent with HEP for maintenance of " improvements gained during therapy sessions  2. PT will report 50% reduction of pain of the back and LE for ease with dressing and grooming activities.   3. Pt will demonstrate trunk and extremity strength to >=4+/5 without the provocation of pain for ease with household chores  4. Pt will demonstrate appropriate upright posture without external cueing for ease with work related activities.   5. Pt to demonstrate improved functional ability with FOTO limitation <=24% disability.      Plan     Continue with pelvic stabilization exercises and progress as tolerated by patient. - PN next visit    Asha Mascorro, PT

## 2018-10-17 ENCOUNTER — CLINICAL SUPPORT (OUTPATIENT)
Dept: REHABILITATION | Facility: OTHER | Age: 63
End: 2018-10-17
Payer: MEDICAID

## 2018-10-17 DIAGNOSIS — M62.89 MUSCLE TIGHTNESS: ICD-10-CM

## 2018-10-17 DIAGNOSIS — Z74.09 DECREASED STRENGTH, ENDURANCE, AND MOBILITY: ICD-10-CM

## 2018-10-17 DIAGNOSIS — R68.89 DECREASED STRENGTH, ENDURANCE, AND MOBILITY: ICD-10-CM

## 2018-10-17 DIAGNOSIS — R53.1 DECREASED STRENGTH, ENDURANCE, AND MOBILITY: ICD-10-CM

## 2018-10-17 DIAGNOSIS — M54.41 ACUTE RIGHT-SIDED LOW BACK PAIN WITH RIGHT-SIDED SCIATICA: ICD-10-CM

## 2018-10-17 PROCEDURE — 97110 THERAPEUTIC EXERCISES: CPT | Mod: PN

## 2018-10-17 NOTE — PROGRESS NOTES
"                            Physical Therapy Daily Treatment Note     Name: Cheri Spencer  Clinic Number: [id number]    Therapy Diagnosis:   Encounter Diagnoses   Name Primary?    Acute right-sided low back pain with right-sided sciatica     Muscle tightness     Decreased strength, endurance, and mobility      Physician: Martínez Gibbs MD    Visit Date: 10/17/2018  Physician Orders: PT eval and treat  Medical Diagnosis: M54.41 (ICD-10-CM) - Lumbago with sciatica, right side  Evaluation Date: 9/5/18  Authorization Period Expiration: 12/31/18  Plan of Care Certification Period: 12/5/18  Visit #/Visits authorized: 6/ 20 (based on medical necessity)    Time In: 4:59 pm  Time Out:: 6:05 pm  Total Billable Time: 56 minutes    Precautions: Hx R knee scope. Hx smoking/alcohol use    Subjective   Pt reports: he just got off of work so his pain is a bit higher. States he has does HEP, but not as often as instructed.    Response to previous treatment: felt better after last session  Functional change: none noted to date    Pain: 7/10  Location: back  bilateral    Objective   Arrived to therapy in pelvic dysfunction. R anterior rotated innominate, L posterior rotated innominate    CHEIR received therapeutic exercises to develop strength, ROM, flexibility, posture and core stabilization for 56 minutes including:    Upright bike 8 min, L2    Push/pull 2 x 3 x 3"  Supine active hamstring stretch 10 x 10"  Piriformis stretch 10 x 10"  Gluteal set 10 x 10"  Bridging 10 x 10"  Side lying clams 10 x 10"  Posterior pelvic tilt 10 x 10"  LTR 10 x 10"  Prone hip extension 10 x 10"  HL hip adduction with tva activation 10" x 10  Seated sciatic nerve glides R x 20  Narrow rows: 2 x 10 x GTB, np  Single Arm Lat Pull Down: 2 x 10 x GTB, np      Pt received 10 min moist heat low back post RX for pain reduction    Home Exercises Provided and Patient Education Provided     Education provided:   PT stressed importance of pt performing " HEP    Written Home Exercises Provided: yes.  Exercises were reviewed and CHERI was able to demonstrate them prior to the end of the session.  CHERI demonstrated good  understanding of the education provided.     See EMR under Patient Instructions for exercises provided 9/17/2018.      CMS Impairment/Limitation/Restriction for FOTO Low Back Survey    Therapist reviewed FOTO scores for Cheri Spencer on 10/17/2018.   FOTO documents entered into [employer] - see Media section.    Limitation Score: 32%  Category: Mobility    Current : CJ = at least 20% but < 40% impaired, limited or restricted  Goal: CJ = at least 20% but < 40% impaired, limited or restricted         Assessment   Push/pull promoted correct pelvic alignment. Pt did lose proper alignment with there ex today. Performed additional set of push/pull at the end of the session.    CHERI is progressing well towards his goals.   Pt prognosis is Good.     Pt will continue to benefit from skilled outpatient physical therapy to address the deficits listed in the problem list box on initial evaluation, provide pt/family education and to maximize pt's level of independence in the home and community environment.     Pt's spiritual, cultural and educational needs considered and pt agreeable to plan of care and goals.    Anticipated barriers to physical therapy: none    Goals:   Short Term Goals (4 Weeks):   1. Pt will report 20% reduction in pain of the lumbar spine and LE for ease with ADL's  2. PT will demonstrate improved trunk strength by a half grade in for ease with upright posture during standing activities.  3. Pt will demonstrate improved lumbar spine ROM in all directions by a half grade for ease with bending activities.   4. Pt to demonstrate improved functional ability with FOTO limitation <=34% disability.     Long Term Goals (8 Weeks):   1. Pt will report being independent with HEP for maintenance of improvements gained during therapy sessions  2. PT will report  50% reduction of pain of the back and LE for ease with dressing and grooming activities.   3. Pt will demonstrate trunk and extremity strength to >=4+/5 without the provocation of pain for ease with household chores  4. Pt will demonstrate appropriate upright posture without external cueing for ease with work related activities.   5. Pt to demonstrate improved functional ability with FOTO limitation <=24% disability.      Plan     Continue with pelvic stabilization exercises and progress as tolerated by patient. - PN next visit    Jann Kirkland, PT

## 2020-07-20 ENCOUNTER — HOSPITAL ENCOUNTER (EMERGENCY)
Facility: OTHER | Age: 65
Discharge: HOME | End: 2020-07-20
Attending: EMERGENCY MEDICINE

## 2020-07-20 VITALS
WEIGHT: 152 LBS | HEIGHT: 71 IN | OXYGEN SATURATION: 99 % | BODY MASS INDEX: 21.28 KG/M2 | TEMPERATURE: 98 F | HEART RATE: 98 BPM | SYSTOLIC BLOOD PRESSURE: 144 MMHG | RESPIRATION RATE: 16 BRPM | DIASTOLIC BLOOD PRESSURE: 92 MMHG

## 2020-07-20 DIAGNOSIS — M79.602 LEFT ARM PAIN: Primary | ICD-10-CM

## 2020-07-20 PROCEDURE — 99283 EMERGENCY DEPT VISIT LOW MDM: CPT

## 2020-07-20 RX ORDER — METHOCARBAMOL 500 MG/1
1000 TABLET, FILM COATED ORAL 3 TIMES DAILY
Qty: 30 TABLET | Refills: 0 | Status: SHIPPED | OUTPATIENT
Start: 2020-07-20 | End: 2020-07-25

## 2020-07-20 NOTE — ED PROVIDER NOTES
Encounter Date: 7/20/2020       History     Chief Complaint   Patient presents with    Arm Pain     L arm pain that began Thursday. Pt states his am and fingers are numb.         Patient is a 64-year-old male presenting emergency department with complaints of left upper extremity discomfort.  Patient states his symptoms started on 07/16/2020.  He states that he has pain in his left shoulder and neck and has numbness and tingling shooting down his left arm.  He denies any fall or injury.  He admits that he was working, and was doing a large amount of pulling at work.  He has been taking 800 mg of ibuprofen for symptoms with some improvement.This is the extent of the patient's complaints at this time.       The history is provided by the patient.     Review of patient's allergies indicates:  No Known Allergies  Past Medical History:   Diagnosis Date    Pneumonia      Past Surgical History:   Procedure Laterality Date    KNEE ARTHROSCOPY       No family history on file.  Social History     Tobacco Use    Smoking status: Current Every Day Smoker     Packs/day: 2.00     Types: Cigarettes    Smokeless tobacco: Never Used   Substance Use Topics    Alcohol use: Yes     Comment: Socially    Drug use: No     Review of Systems   Constitutional: Negative for activity change, chills, fatigue and fever.   HENT: Negative for congestion, rhinorrhea and sore throat.    Eyes: Negative for photophobia and visual disturbance.   Respiratory: Negative for cough and shortness of breath.    Cardiovascular: Negative for chest pain.   Gastrointestinal: Negative for abdominal pain, diarrhea, nausea and vomiting.   Genitourinary: Negative for dysuria, hematuria and urgency.   Musculoskeletal: Positive for myalgias and neck pain. Negative for back pain.        Arm pain   Skin: Negative for color change and wound.   Neurological: Negative for weakness and headaches.   Psychiatric/Behavioral: Negative for agitation and confusion.        Physical Exam     Initial Vitals [07/20/20 1133]   BP Pulse Resp Temp SpO2   (!) 144/92 98 16 98.1 °F (36.7 °C) 99 %      MAP       --         Physical Exam    Nursing note and vitals reviewed.  Constitutional: He appears well-developed and well-nourished. He is not diaphoretic.  Non-toxic appearance. He does not have a sickly appearance. No distress.     Well-appearing,  male accompanied the emergency room.  Speaking clearly full sentences.  No acute distress.   HENT:   Head: Normocephalic and atraumatic.   Right Ear: External ear normal.   Left Ear: External ear normal.   Nose: Nose normal.   Mouth/Throat: Oropharynx is clear and moist.   Eyes: Conjunctivae and EOM are normal.   Neck: Normal range of motion. Neck supple.   Musculoskeletal: Normal range of motion.      Comments:   Tenderness to palpation left-sided paraspinal muscle cervical spine at radiate along the trapezius.  No palpable deformity, crepitus, step-off.  Normal range of motion, strength, sensation.  No skin changes noted to the left upper extremity.  No bony deformity, crepitus, step-off.  Normal range of motion, strength, sensation of upper extremities well as the digits.  Normal capillary refill.  Normal pulses.   Neurological: He is alert and oriented to person, place, and time.   Skin: Skin is warm.   Psychiatric: He has a normal mood and affect. His behavior is normal. Judgment and thought content normal.         ED Course   Procedures  Labs Reviewed - No data to display          Medical Decision Making:   Initial Assessment:       Urgent evaluation of a 64-year-old male presenting emergency department for evaluation of left upper extremity discomfort.  Patient is afebrile, nontoxic appearing, hemodynamically stable.  Pain is reproducible on exam, tenderness palpation left-sided paraspinal muscle cervical spine.  Neurovascularly intact.  No history of injury or trauma.    ED Management:      Given patient's history and  physical exam findings, signs symptoms most consistent with musculoskeletal etiology in his addition to cervical  Radiculopathy.  Patient is already on ibuprofen.  Will add Robaxin to his current treatment regimen.  Counseled home care treatment.  Discharged in stable condition.The patient was instructed to follow up with a primary care provider in 2 days or to return to the emergency department for worsening symptoms. The treatment plan was discussed with the patient who demonstrated understanding and comfort with plan.    This note was created using [employer] Direct. There may be typographical errors secondary to dictation.                                    Clinical Impression:     1. Left arm pain         Disposition:   Disposition: Discharged  Condition: Stable     ED Disposition Condition    Discharge Stable        ED Prescriptions     Medication Sig Dispense Start Date End Date Auth. Provider    methocarbamoL (ROBAXIN) 500 MG Tab Take 2 tablets (1,000 mg total) by mouth 3 (three) times daily. for 5 days 30 tablet 7/20/2020 7/25/2020 Radha Latif PA-C        Follow-up Information     Follow up With Specialties Details Why Contact Info    [facility]   [address] [id number] [phone]    PCP                                         Radha Latif PA-C  07/20/20 [id number]

## 2020-07-20 NOTE — PROVIDER PROGRESS NOTES - EMERGENCY DEPT.
Emergency Department TeleTRIAGE Encounter Note      CHIEF COMPLAINT    Chief Complaint   Patient presents with    Arm Pain     L arm pain that began Thursday. Pt states his am and fingers are numb.       VITAL SIGNS   Initial Vitals [07/20/20 1133]   BP Pulse Resp Temp SpO2   (!) 144/92 98 16 98.1 °F (36.7 °C) 99 %      MAP       --            ALLERGIES    Review of patient's allergies indicates:  No Known Allergies    PROVIDER TRIAGE NOTE  Patient is complain about left arm pain stretching from his trapezius throughout the left arm.  He also complains of some numbness in his left hand but involved both the thumb and the 5th finger no distinct nerve distribution.  I will defer any additional imaging or treatment to my colleague at evaluate the patient face-to-face emergency department.      ORDERS  Labs Reviewed - No data to display    ED Orders (720h ago, onward)    None            Virtual Visit Note: The provider triage portion of this emergency department evaluation and documentation was performed via [employer], a HIPAA-compliant telemedicine application, in concert with a tele-presenter in the room. A face to face patient evaluation with one of my colleagues will occur once the patient is placed in an emergency department room.      DISCLAIMER: This note was prepared with [employer] voice recognition transcription software. Garbled syntax, mangled pronouns, and other bizarre constructions may be attributed to that software system.

## 2020-07-27 ENCOUNTER — HOSPITAL ENCOUNTER (EMERGENCY)
Facility: OTHER | Age: 65
Discharge: HOME | End: 2020-07-27
Attending: EMERGENCY MEDICINE

## 2020-07-27 VITALS
RESPIRATION RATE: 16 BRPM | SYSTOLIC BLOOD PRESSURE: 162 MMHG | DIASTOLIC BLOOD PRESSURE: 86 MMHG | HEART RATE: 68 BPM | TEMPERATURE: 99 F | OXYGEN SATURATION: 99 %

## 2020-07-27 DIAGNOSIS — M54.12 CERVICAL RADICULOPATHY: Primary | ICD-10-CM

## 2020-07-27 DIAGNOSIS — M79.602 PAIN OF LEFT UPPER EXTREMITY: ICD-10-CM

## 2020-07-27 PROCEDURE — 25000003 PHARM REV CODE 250: Performed by: EMERGENCY MEDICINE

## 2020-07-27 PROCEDURE — 96372 THER/PROPH/DIAG INJ SC/IM: CPT

## 2020-07-27 PROCEDURE — 63600175 PHARM REV CODE 636 W HCPCS: Performed by: EMERGENCY MEDICINE

## 2020-07-27 PROCEDURE — 99284 EMERGENCY DEPT VISIT MOD MDM: CPT | Mod: 25

## 2020-07-27 RX ORDER — CYCLOBENZAPRINE HCL 10 MG
10 TABLET ORAL 3 TIMES DAILY PRN
Qty: 15 TABLET | Refills: 0 | Status: SHIPPED | OUTPATIENT
Start: 2020-07-27 | End: 2020-08-01

## 2020-07-27 RX ORDER — KETOROLAC TROMETHAMINE 30 MG/ML
30 INJECTION, SOLUTION INTRAMUSCULAR; INTRAVENOUS
Status: COMPLETED | OUTPATIENT
Start: 2020-07-27 | End: 2020-07-27

## 2020-07-27 RX ORDER — CYCLOBENZAPRINE HCL 10 MG
10 TABLET ORAL
Status: COMPLETED | OUTPATIENT
Start: 2020-07-27 | End: 2020-07-27

## 2020-07-27 RX ORDER — METHYLPREDNISOLONE 4 MG/1
TABLET ORAL
Qty: 1 PACKAGE | Refills: 0 | Status: SHIPPED | OUTPATIENT
Start: 2020-07-27 | End: 2020-08-17

## 2020-07-27 RX ORDER — OXYCODONE AND ACETAMINOPHEN 5; 325 MG/1; MG/1
2 TABLET ORAL
Status: COMPLETED | OUTPATIENT
Start: 2020-07-27 | End: 2020-07-27

## 2020-07-27 RX ORDER — IBUPROFEN 600 MG/1
600 TABLET ORAL EVERY 6 HOURS PRN
Qty: 20 TABLET | Refills: 0 | Status: SHIPPED | OUTPATIENT
Start: 2020-07-27

## 2020-07-27 RX ADMIN — OXYCODONE HYDROCHLORIDE AND ACETAMINOPHEN 2 TABLET: 5; 325 TABLET ORAL at 09:07

## 2020-07-27 RX ADMIN — CYCLOBENZAPRINE HYDROCHLORIDE 10 MG: 10 TABLET, FILM COATED ORAL at 10:07

## 2020-07-27 RX ADMIN — KETOROLAC TROMETHAMINE 30 MG: 30 INJECTION, SOLUTION INTRAMUSCULAR at 09:07

## 2020-07-28 NOTE — ED PROVIDER NOTES
"Encounter Date: 7/27/2020    SCRIBE #1 NOTE: I, Chris Sue, am scribing for, and in the presence of, Dr. Hargrove.   SCRIBE #2 NOTE: I, Dania Ruvalcaba, am scribing for, and in the presence of, Dr. Hargrove.     History     Chief Complaint   Patient presents with    Arm Pain     x's 1 week     Time seen by provider: 8:51 PM    This is a 64 y.o. male who presents with complaint of left arm pain that has been present for months, and worsened "about a week and a half ago". He denies any falls or trauma. He states that the pain radiates from his left neck all the way down his arm, through his elbow, to his fingertips. Pain is aggravated with movement of left shoulder and alleviated with holding arm above his head. He also reports numbness in left 2nd, 3rd, and 4th fingers, but no decreased strength. He was taking a muscle relaxer along with ibuprofen for the pain with no improvement - he has taken nothing today. He is right hand dominant. He denies fever, chills, cough, rhinorrhea, SOB, nausea, vomiting, or urinary incontinence. He has no allergies or recent surgeries. He smokes 1/2 pack of cigarettes a day and smokes marijuana "every now and then." He denies use of alcohol.    The history is provided by the patient.     Review of patient's allergies indicates:  No Known Allergies  Past Medical History:   Diagnosis Date    Pneumonia      Past Surgical History:   Procedure Laterality Date    KNEE ARTHROSCOPY       No family history on file.  Social History     Tobacco Use    Smoking status: Current Every Day Smoker     Packs/day: 2.00     Types: Cigarettes    Smokeless tobacco: Never Used   Substance Use Topics    Alcohol use: Yes     Comment: Socially    Drug use: No     Review of Systems   Constitutional: Negative for chills and fever.   HENT: Negative for congestion, rhinorrhea, sore throat and trouble swallowing.    Eyes: Negative for photophobia and visual disturbance.   Respiratory: Negative for cough and " shortness of breath.    Cardiovascular: Negative for chest pain.   Gastrointestinal: Negative for abdominal pain, nausea and vomiting.   Genitourinary: Negative for dysuria and hematuria.        Negative for urinary incontinence.    Musculoskeletal: Positive for arthralgias (left shoulder) and neck pain. Negative for back pain.        Positive for left arm pain.   Skin: Negative for rash and wound.   Neurological: Positive for numbness (left fingers). Negative for weakness and headaches.   Psychiatric/Behavioral: Negative for behavioral problems and confusion.       Physical Exam     Initial Vitals [07/27/20 2041]   BP Pulse Resp Temp SpO2   (!) 187/91 93 18 98.2 °F (36.8 °C) 99 %      MAP       --         Physical Exam    Nursing note and vitals reviewed.  Constitutional: He appears well-developed and well-nourished. No distress.   HENT:   Head: Normocephalic and atraumatic.   Eyes: Conjunctivae and EOM are normal.   Neck: Normal range of motion. Neck supple.   Cardiovascular: Normal rate, regular rhythm, normal heart sounds and intact distal pulses. Exam reveals no gallop and no friction rub.    No murmur heard.  Pulmonary/Chest: Breath sounds normal. No respiratory distress. He has no wheezes. He has no rhonchi. He has no rales.   Abdominal: Soft. There is no abdominal tenderness. There is no rebound and no guarding.   Musculoskeletal: Tenderness present. No edema.      Comments: Left shoulder: Painful AROM. Minimal pain with PROM.  There is tenderness to palpation of left paraspinal cervical muscles and left upper head of trapezius.   Neurological: He is alert and oriented to person, place, and time. He has normal strength. A sensory deficit (Decreased sensation to light touch over left 2nd 3rd and 4th fingers.) is present.   AI/PI/R/U intact to strength and sensation. There is a sensory deficit over distal median nerve distribution.   Skin: Skin is dry. No rash noted.   Psychiatric: He has a normal mood and  affect. His behavior is normal. Judgment and thought content normal.         ED Course   Procedures  Labs Reviewed - No data to display       Imaging Results          CT Cervical Spine Without Contrast (Final result)  Result time 07/27/20 21:31:32    Final result by Fouzia Pressley MD (07/27/20 21:31:32)                 Impression:      No acute cervical fracture.  Spondylitic changes.      Electronically signed by: Fouzia Pressley  Date:    07/27/2020  Time:    21:31             Narrative:    EXAMINATION:  CT OF THE CERVICAL  SPINE WITHOUT    CLINICAL HISTORY:  Cervical radiculopathy;    TECHNIQUE:  2.5 mm axial images were obtained through the cervical  spine. Coronal and sagittal reformatted images were provided.    COMPARISON:  None.    FINDINGS:  There is straightening of the normal cervical lordosis..  There is no prevertebral soft tissue swelling.  There is no vertebral body fracture or subluxation.  Spondylitic changes are present.  Emphysematous blebs and bulla are seen at the apices.                                 Medical Decision Making:   History:   Old Medical Records: I decided to obtain old medical records.  Clinical Tests:   Radiological Study: Ordered and Reviewed  ED Management:  Emergent evaluation of 64-year-old male with complaint of acute on chronic left shoulder pain, left arm pain, left fingers numbness.  Vital signs are benign, afebrile.  On exam extensive muscle spasm palpated through left paraspinal cervical muscles and left upper head of trapezius.  There is painful active range of motion of the left shoulder, but no pain with passive range of motion.  There is a distal sensory deficit over median nerve distribution.  Physical exam presents mixed picture cervical radiculopathy versus distal monitor neuropathy.  CT of the cervical spine shows no fracture or dislocation, note is made of muscle spasm causing cervical lordosis loss.  He was treated with analgesics and muscle relaxers in  the ED with improvement is discharged in good condition.  He is encouraged to follow-up with PCP for outpatient MRI and further workup.  He is also advised to return for any new or worsening symptoms.            Scribe Attestation:   Scribe #1: I performed the above scribed service and the documentation accurately describes the services I performed. I attest to the accuracy of the note.  Scribe #2: I performed the above scribed service and the documentation accurately describes the services I performed. I attest to the accuracy of the note.    Attending Attestation:           Physician Attestation for Scribe:  Physician Attestation Statement for Scribe #1: I, Dr. Hargrove, reviewed documentation, as scribed by Chris Sue in my presence, and it is both accurate and complete.   Physician Attestation Statement for Scribe #2: I, Dr. Hargrove, reviewed documentation, as scribed by Dania Ruvalcaba in my presence, and it is both accurate and complete. I also acknowledge and confirm the content of the note done by Scribe #1.                            Clinical Impression:     1. Cervical radiculopathy    2. Pain of left upper extremity              ED Disposition Condition    Discharge Stable        ED Prescriptions     Medication Sig Dispense Start Date End Date Auth. Provider    ibuprofen (ADVIL,MOTRIN) 600 MG tablet Take 1 tablet (600 mg total) by mouth every 6 (six) hours as needed for Pain. 20 tablet 7/27/2020  Chelsey Hargrove MD    cyclobenzaprine (FLEXERIL) 10 MG tablet Take 1 tablet (10 mg total) by mouth 3 (three) times daily as needed for Muscle spasms. 15 tablet 7/27/2020 8/1/2020 Chelsey Hargrove MD    methylPREDNISolone (MEDROL DOSEPACK) 4 mg tablet Take as directed on package 1 Package 7/27/2020 8/17/2020 Chelsey Hargrove MD        Follow-up Information     Follow up With Specialties Details Why Contact Info    [facility]  Schedule an appointment as soon as possible for a visit  Or your  regular doctor if you are ready have 1 established [address]  [facility] [address]  [phone]      [facility] Emergency Medicine  As needed, If symptoms worsen [address]  [facility] [id number] [phone]                                     Chelsey Hargrove MD  07/28/20 [id number]

## 2020-07-28 NOTE — DISCHARGE INSTRUCTIONS
The next step in your treatment will be to have an MRI of the cervical spine.  Please arrange this with your doctor as an outpatient.

## 2020-07-28 NOTE — ED TRIAGE NOTES
Pt came to Ed c/o left arm pain radiating to fingers since 7/17. Pt reports visiting ER 7/17 for same problems. Pt reports pain increasing since. Pt denies CP, SOB, and HA.  Neurologic: Sensation is intact. Speech is clear and appropriate. Eyes open spontaneously, behavior appropriate to situation, follows commands,  purposeful motor response noted.   Cardiac:  No Bilateral lower extremity edema. Cap refill is <3 seconds.   Abdomen: Pt denies active abd pains, cramping or discomfort. Intermittent vomiting noted. ABD non tender soft.   : Pt reports no dysuria or hematuria.

## 2023-06-28 DIAGNOSIS — M54.16 LUMBAR RADICULOPATHY: Primary | ICD-10-CM

## 2023-09-01 ENCOUNTER — CLINICAL SUPPORT (OUTPATIENT)
Dept: REHABILITATION | Facility: HOSPITAL | Age: 68
End: 2023-09-01
Payer: COMMERCIAL

## 2023-09-01 DIAGNOSIS — M79.604 PAIN IN RIGHT LEG: ICD-10-CM

## 2023-09-01 DIAGNOSIS — M54.50 LUMBAR PAIN: ICD-10-CM

## 2023-09-01 DIAGNOSIS — R53.1 DECREASED STRENGTH: ICD-10-CM

## 2023-09-01 PROCEDURE — 97161 PT EVAL LOW COMPLEX 20 MIN: CPT

## 2023-09-01 PROCEDURE — 97110 THERAPEUTIC EXERCISES: CPT

## 2023-09-01 NOTE — PLAN OF CARE
[facility]   Physical Therapy Initial Evaluation      Name: Cheri Spencer  Clinic Number: [id number]    Therapy Diagnosis:   Encounter Diagnoses   Name Primary?    Lumbar pain     Pain in right leg     Decreased strength         Physician: Leonela Bridges MD    Physician Orders: PT Eval and Treat   Medical Diagnosis from Referral: M54.16 (ICD-10-CM) - Lumbar radiculopathy  Evaluation Date: 9/1/2023  Authorization Period Expiration: 12/31/23  Plan of Care Expiration: 10/13/23  Progress Note Due: 10/1/23  Visit # / Visits authorized: 1/ 1   FOTO: 1    Precautions: Standard     Time In: 10:58 am   Time Out: 11:38 am   Total Billable Time: 40 minutes    Subjective     Date of onset: chronic     History of current condition - CHERI reports: that he experiences pain from (R) side of lower back down whole (R) LE to foot. Pt stated that pain in back is constant, but pain in (R) LE is not constant. Pt stated that (R) LE feels weak. Pt stated that he has no pain in (L) LE, but whole (L) LE feels numb constantly. Pt stated that he has been experiencing these symptoms for over two years. Pt denies specific injury. Pt stated that he had nerve blocks about two years ago that did not help. Pt stated that he has had imaging on his back recently and was told he has arthritis      Falls: no     Imaging:  see imaging     Prior Therapy: yes and stated did help  Social History: Pt stated that he lives with family. Pt stated that he has no steps to enter his two story home. Pt stated that he lives downstairs, so does not have to go upstairs.  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[employer], does a lot of walking and lifting - pt stated doesn't bother him to perform lifting, watches body mechanics or will get help  Prior Level of Function: ambulating with walking stick for about 9 months   Current Level of Function: ambulating with walking stick, report of  pain/difficulty performing aggravating factors listed below     Pain:  Current 5/10, worst 9/10, best 0/10   Location: right lumbar and whole (R) LE   Description: sharp in back, aching in (R) LE   Aggravating Factors: if stand too long or walk too long  Easing Factors: lying down and resting     Patients goals: get strength in his legs      Medical History:   Past Medical History:   Diagnosis Date    Pneumonia        Surgical History:   Linden Spencer  has a past surgical history that includes Knee arthroscopy.    Medications:   Linden has a current medication list which includes the following prescription(s): ibuprofen.    Allergies:   Review of patient's allergies indicates:  No Known Allergies     Objective      Lumbar AROM: Pain/Dysfunction with Movement:   Flexion 75 degrees     Extension 10 degrees C/o pain in mid lumbar region    Right side bending 20 degrees C/o pain across lumbar region   Left side bending 20 degrees C/o pain across lumbar region      Hip Right  Left  Pain/Dysfunction with Movement    AROM MMT AROM MMT != pain  NT = not tested    Flexion WFL 4+/5 WFL 4+/5    Extension NT NT NT NT Pt performed good bridge against gravity, and reported experiencing a little pain    Abduction WFL 4/5 WFL 4/5    External rotation Limited! 3+/5 WFL 4+/5 (R) MMT performed within available ROM      Knee Right  Left  Pain/Dysfunction with Movement    AROM MMT AROM MMT    Flexion WFL 5/5 WFL 5/5    Extension limited 4-/5 WFL 5/5 (R) MMT performed within available ROM     Ankle Right  Left  Pain/Dysfunction with Movement    AROM MMT AROM MMT    Plantarflexion WFL 4+/5 WFL 4+/5    Dorsiflexion limited 2-/5 WFL 5/5      90/90 Hamstring Test: (R) = 40 degrees lacking, (L) = 10 degrees lacking    SLR Test: negative (B)     Posture: pt demo flexed posture     Gait: pt ambulated with walking stick, demo antalgic gait and demo decreased heel toe patten  Intake Outcome Measure for FOTO Lumbar Survey    Therapist reviewed FOTO  "scores for Cheri Spencer on 9/1/2023.   FOTO report - see Media section or FOTO account episode details.    Intake Score: 62         Treatment     Total Treatment time (time-based codes) separate from Evaluation: 11 minutes     CHERI received the treatments listed below:      therapeutic exercises to develop ROM, flexibility, and decreased pain for 9 minutes including:  Piriformis stretch 3x30" B  Sciatic nerve glides 3x10 R  Seated hamstring stretch 3x30" B      neuromuscular re-education activities to improve: core stabilization for 2 minutes. The following activities were included:  Posterior pelvic tilts 5" x10      Patient Education and Home Exercises     Education provided: pt verbalized understanding of all education provided   - Role of PT  - HEP  - pt was instructed to stop performing particular therex/activity if particular therex/activity causes/increases pain - pt verbalized understanding    Written Home Exercises Provided: yes. Exercises were reviewed and CHERI was able to demonstrate them prior to the end of the session.  CHERI demonstrated good  understanding of the education provided. See EMR under Patient Instructions for exercises provided during therapy sessions.    Assessment     Cheri is a 67 y.o. male referred to outpatient Physical Therapy with a medical diagnosis of Lumbar radiculopathy. Patient presents with c/o pain when performing lumbar extension and (B) SB AROM, decreased (B) LE strength, decreased (R) hamstring flexibility, impaired posture, and decreased functional mobility.     Patient prognosis is Fair.   Patient will benefit from skilled outpatient Physical Therapy to address the deficits stated above and in the chart below, provide patient /family education, and to maximize patientt's level of independence.     Plan of care discussed with patient: Yes  Patient's spiritual, cultural and educational needs considered and patient is agreeable to the plan of care and goals as stated " below:     Anticipated Barriers for therapy: chronicity of symptoms    Medical Necessity is demonstrated by the following  History  Co-morbidities and personal factors that may impact the plan of care [x] LOW: no personal factors / co-morbidities  [] MODERATE: 1-2 personal factors / co-morbidities  [] HIGH: 3+ personal factors / co-morbidities    Moderate / High Support Documentation:   Co-morbidities affecting plan of care: n/a    Personal Factors:   no deficits     Examination  Body Structures and Functions, activity limitations and participation restrictions that may impact the plan of care [] LOW: addressing 1-2 elements  [] MODERATE: 3+ elements  [x] HIGH: 4+ elements (please support below)    Moderate / High Support Documentation: ROM, strength, gait, and posture     Clinical Presentation [x] LOW: stable  [] MODERATE: Evolving  [] HIGH: Unstable     Decision Making/ Complexity Score: low       Goals:  Short Term Goals: 1 weeks   Pt will be compliant with HEP to supplement PT with decreasing pain and improving functional mobility    Long Term Goals: 6 weeks   Pt will improve FOTO score to 68 in order to demo improved functional mobility  Pt will improve LE MMT scores by at least 1/2 grade where deficits noted in order to improve strength for functional tasks  Pt will report lumbar pain and pain in (R) LE </= 5/10 at worst in order to be able to perform ADLs with less difficulty  Pt will improve (R) hamstring flexibility by at least 15 degrees in order to improve functional mobility   Plan     Plan of care Certification: 9/1/2023 to 10/13/23.    Outpatient Physical Therapy 2 times weekly for 6 weeks to include the following interventions: Gait Training, Manual Therapy, Moist Heat/ Ice, Neuromuscular Re-ed, Patient Education, Self Care, Therapeutic Activities, Therapeutic Exercise, and IASTM, dry needling, and modalities prn .     Mira Perez, PT        Physician's Signature:  _________________________________________ Date: ________________

## 2023-09-08 ENCOUNTER — CLINICAL SUPPORT (OUTPATIENT)
Dept: REHABILITATION | Facility: HOSPITAL | Age: 68
End: 2023-09-08
Payer: COMMERCIAL

## 2023-09-08 DIAGNOSIS — R53.1 DECREASED STRENGTH: ICD-10-CM

## 2023-09-08 DIAGNOSIS — M54.50 LUMBAR PAIN: Primary | ICD-10-CM

## 2023-09-08 DIAGNOSIS — M79.604 PAIN IN RIGHT LEG: ICD-10-CM

## 2023-09-08 PROCEDURE — 97110 THERAPEUTIC EXERCISES: CPT

## 2023-09-08 PROCEDURE — 97112 NEUROMUSCULAR REEDUCATION: CPT

## 2023-09-08 NOTE — PROGRESS NOTES
"  Physical Therapy Daily Treatment Note     Name: Cheri Spencer  Clinic Number: [id number]    Therapy Diagnosis:   Encounter Diagnoses   Name Primary?    Lumbar pain Yes    Pain in right leg     Decreased strength      Physician: Leonela Bridges MD    Visit Date: 9/8/2023    Physician Orders: PT Eval and Treat   Medical Diagnosis from Referral: M54.16 (ICD-10-CM) - Lumbar radiculopathy  Evaluation Date: 9/1/2023  Authorization Period Expiration: 12/31/23  Plan of Care Expiration: 10/13/23  Progress Note Due: 10/1/23  Visit # / Visits authorized: 1/ 1 , 1/15  FOTO: 2    Time In: 11:33 am   Time Out: 12:15 pm   Total Billable Time: 42 minutes    Precautions: Standard    Subjective     Pt reports: that he is not currently experiencing pain.   He was compliant with home exercise program.  Response to previous treatment: last session was initial evaluation  Functional change: progressing     Pain: 0/10 (B) back and LE       Objective     therapeutic exercises to develop ROM, flexibility, and decreased pain for 33 minutes including:  Piriformis stretch 3x30" B  Sciatic nerve glides 3x10 R  Glut sets 5" 2x10   SL clams 2x10 3" B   SL hip abduction 2x10 B   SAQ 2x10 2" 2# B   Seated hamstring stretch 3x30" B        neuromuscular re-education activities to improve:  stabilization and posture for 9 minutes. The following activities were included:  Posterior pelvic tilts 5" 2x10  (B) UE ER 2x10 3" RTB  Rows/scap retraction 2x10 3" GTB     Home Exercises Provided and Patient Education Provided     Education provided:   - HEP - stop performing particular therex/activity if experience increased pain - pt verbalized understanding    Written Home Exercises Provided: yes.  Exercises were reviewed and CHERI was able to demonstrate them prior to the end of the session.  CHERI demonstrated good  understanding of the education provided.     See EMR under Patient Instructions for exercises provided 9/8/2023.      Assessment     Pt received TC " for proper alignment when performing SL hip abduction. Pt received VC to keep back straight and bend at his hips when performing seated hamstring stretch. Pt tolerated therapy session without c/o pain.   CHERI Is progressing towards his goals.   Pt prognosis is Fair.     Pt will continue to benefit from skilled outpatient physical therapy to address the deficits listed in the problem list box on initial evaluation, provide pt/family education and to maximize pt's level of independence in the home and community environment.     Pt's spiritual, cultural and educational needs considered and pt agreeable to plan of care and goals.    Anticipated barriers to physical therapy: chronicity of symptoms    Goals:     Short Term Goals: 1 weeks   Pt will be compliant with HEP to supplement PT with decreasing pain and improving functional mobility     Long Term Goals: 6 weeks   Pt will improve FOTO score to 68 in order to demo improved functional mobility  Pt will improve LE MMT scores by at least 1/2 grade where deficits noted in order to improve strength for functional tasks  Pt will report lumbar pain and pain in (R) LE </= 5/10 at worst in order to be able to perform ADLs with less difficulty  Pt will improve (R) hamstring flexibility by at least 15 degrees in order to improve functional mobility     Plan     Continue per POC, progress as tolerated     Mira Perez, PT

## 2023-09-11 ENCOUNTER — CLINICAL SUPPORT (OUTPATIENT)
Dept: REHABILITATION | Facility: HOSPITAL | Age: 68
End: 2023-09-11
Payer: COMMERCIAL

## 2023-09-11 DIAGNOSIS — M79.604 PAIN IN RIGHT LEG: ICD-10-CM

## 2023-09-11 DIAGNOSIS — R53.1 DECREASED STRENGTH: ICD-10-CM

## 2023-09-11 DIAGNOSIS — M54.50 LUMBAR PAIN: Primary | ICD-10-CM

## 2023-09-11 PROCEDURE — 97110 THERAPEUTIC EXERCISES: CPT | Mod: CQ

## 2023-09-11 PROCEDURE — 97112 NEUROMUSCULAR REEDUCATION: CPT | Mod: CQ

## 2023-09-11 NOTE — PROGRESS NOTES
"  Physical Therapy Daily Treatment Note     Name: Cheri Spencer  Clinic Number: [id number]    Therapy Diagnosis:   Encounter Diagnoses   Name Primary?    Lumbar pain Yes    Pain in right leg     Decreased strength      Physician: Leonela Bridges MD    Visit Date: 9/11/2023    Physician Orders: PT Eval and Treat   Medical Diagnosis from Referral: M54.16 (ICD-10-CM) - Lumbar radiculopathy  Evaluation Date: 9/1/2023  Authorization Period Expiration: 12/31/23  Plan of Care Expiration: 10/13/23  Progress Note Due: 10/1/23  Visit # / Visits authorized: 1/ 1 , 1/15  FOTO: 2    Time In: 9:15 am   Time Out: 9:58 am   Total Billable Time: 43 minutes    Precautions: Standard    Subjective     Pt reports: he woke up a little later than usual today  He was compliant with home exercise program.  Response to previous treatment: last session was initial evaluation  Functional change: progressing     Pain: 3/10 (B) back and LE       Objective     therapeutic exercises to develop ROM, flexibility, and decreased pain for 33 minutes including:  Piriformis stretch 3x30" B  Sciatic nerve glides 3x10 R  Glut sets 5" 2x10   SL clams 2x10 3" B   SL hip abduction 2x10 B   SAQ 2x10 2" 2# B   Seated hamstring stretch 3x30" B        neuromuscular re-education activities to improve:  stabilization and posture for 9 minutes. The following activities were included:  Posterior pelvic tilts 5" 2x10  (B) UE ER 2x10 3" RTB  Rows/scap retraction 2x10 3" GTB     Home Exercises Provided and Patient Education Provided     Education provided:   - HEP - stop performing particular therex/activity if experience increased pain - pt verbalized understanding    Written Home Exercises Provided: yes.  Exercises were reviewed and CHERI was able to demonstrate them prior to the end of the session.  CHERI demonstrated good  understanding of the education provided.     See EMR under Patient Instructions for exercises provided 9/8/2023.      Assessment     Pt received VC " and demonstration to keep back straight and bend at his hips when performing seated hamstring stretch. Pt tolerated therapy session without c/o pain.   CHERI Is progressing towards his goals.   Pt prognosis is Fair.     Pt will continue to benefit from skilled outpatient physical therapy to address the deficits listed in the problem list box on initial evaluation, provide pt/family education and to maximize pt's level of independence in the home and community environment.     Pt's spiritual, cultural and educational needs considered and pt agreeable to plan of care and goals.    Anticipated barriers to physical therapy: chronicity of symptoms    Goals:     Short Term Goals: 1 weeks   Pt will be compliant with HEP to supplement PT with decreasing pain and improving functional mobility     Long Term Goals: 6 weeks   Pt will improve FOTO score to 68 in order to demo improved functional mobility  Pt will improve LE MMT scores by at least 1/2 grade where deficits noted in order to improve strength for functional tasks  Pt will report lumbar pain and pain in (R) LE </= 5/10 at worst in order to be able to perform ADLs with less difficulty  Pt will improve (R) hamstring flexibility by at least 15 degrees in order to improve functional mobility     Plan     Continue per POC, progress as tolerated     Miguel Lucas, PTA

## 2023-09-26 ENCOUNTER — TELEPHONE (OUTPATIENT)
Dept: REHABILITATION | Facility: HOSPITAL | Age: 68
End: 2023-09-26
Payer: COMMERCIAL

## 2023-09-26 NOTE — TELEPHONE ENCOUNTER
Called and spoke with patient regarding recent missed and no showed appointments. Pt states that he lives far away from our clinic and relies on his daughter to get him to and from his appointments. Determined that it would be more feasible for patient to transfer to clinic closer to home in order to receive consistent treatment. Patient on board with this, and will initiate transfer with supervisor.

## 2023-11-13 ENCOUNTER — OFFICE VISIT (OUTPATIENT)
Dept: SURGERY | Facility: CLINIC | Age: 68
End: 2023-11-13
Attending: SPECIALIST
Payer: COMMERCIAL

## 2023-11-13 VITALS
HEART RATE: 65 BPM | SYSTOLIC BLOOD PRESSURE: 160 MMHG | WEIGHT: 142 LBS | OXYGEN SATURATION: 100 % | HEIGHT: 71 IN | BODY MASS INDEX: 19.88 KG/M2 | DIASTOLIC BLOOD PRESSURE: 81 MMHG

## 2023-11-13 DIAGNOSIS — K40.90 RIGHT INGUINAL HERNIA: Primary | ICD-10-CM

## 2023-11-13 PROCEDURE — 1126F PR PAIN SEVERITY QUANTIFIED, NO PAIN PRESENT: ICD-10-PCS | Mod: CPTII,S$GLB,, | Performed by: SPECIALIST

## 2023-11-13 PROCEDURE — 1160F RVW MEDS BY RX/DR IN RCRD: CPT | Mod: CPTII,S$GLB,, | Performed by: SPECIALIST

## 2023-11-13 PROCEDURE — 3008F BODY MASS INDEX DOCD: CPT | Mod: CPTII,S$GLB,, | Performed by: SPECIALIST

## 2023-11-13 PROCEDURE — 3077F SYST BP >= 140 MM HG: CPT | Mod: CPTII,S$GLB,, | Performed by: SPECIALIST

## 2023-11-13 PROCEDURE — 99204 OFFICE O/P NEW MOD 45 MIN: CPT | Mod: S$GLB,,, | Performed by: SPECIALIST

## 2023-11-13 PROCEDURE — 1101F PR PT FALLS ASSESS DOC 0-1 FALLS W/OUT INJ PAST YR: ICD-10-PCS | Mod: CPTII,S$GLB,, | Performed by: SPECIALIST

## 2023-11-13 PROCEDURE — 1159F MED LIST DOCD IN RCRD: CPT | Mod: CPTII,S$GLB,, | Performed by: SPECIALIST

## 2023-11-13 PROCEDURE — 1159F PR MEDICATION LIST DOCUMENTED IN MEDICAL RECORD: ICD-10-PCS | Mod: CPTII,S$GLB,, | Performed by: SPECIALIST

## 2023-11-13 PROCEDURE — 99999 PR PBB SHADOW E&M-EST. PATIENT-LVL III: ICD-10-PCS | Mod: PBBFAC,,, | Performed by: SPECIALIST

## 2023-11-13 PROCEDURE — 1126F AMNT PAIN NOTED NONE PRSNT: CPT | Mod: CPTII,S$GLB,, | Performed by: SPECIALIST

## 2023-11-13 PROCEDURE — 3079F DIAST BP 80-89 MM HG: CPT | Mod: CPTII,S$GLB,, | Performed by: SPECIALIST

## 2023-11-13 PROCEDURE — 3079F PR MOST RECENT DIASTOLIC BLOOD PRESSURE 80-89 MM HG: ICD-10-PCS | Mod: CPTII,S$GLB,, | Performed by: SPECIALIST

## 2023-11-13 PROCEDURE — 3077F PR MOST RECENT SYSTOLIC BLOOD PRESSURE >= 140 MM HG: ICD-10-PCS | Mod: CPTII,S$GLB,, | Performed by: SPECIALIST

## 2023-11-13 PROCEDURE — 99204 PR OFFICE/OUTPT VISIT, NEW, LEVL IV, 45-59 MIN: ICD-10-PCS | Mod: S$GLB,,, | Performed by: SPECIALIST

## 2023-11-13 PROCEDURE — 3288F FALL RISK ASSESSMENT DOCD: CPT | Mod: CPTII,S$GLB,, | Performed by: SPECIALIST

## 2023-11-13 PROCEDURE — 1101F PT FALLS ASSESS-DOCD LE1/YR: CPT | Mod: CPTII,S$GLB,, | Performed by: SPECIALIST

## 2023-11-13 PROCEDURE — 3008F PR BODY MASS INDEX (BMI) DOCUMENTED: ICD-10-PCS | Mod: CPTII,S$GLB,, | Performed by: SPECIALIST

## 2023-11-13 PROCEDURE — 3288F PR FALLS RISK ASSESSMENT DOCUMENTED: ICD-10-PCS | Mod: CPTII,S$GLB,, | Performed by: SPECIALIST

## 2023-11-13 PROCEDURE — 1160F PR REVIEW ALL MEDS BY PRESCRIBER/CLIN PHARMACIST DOCUMENTED: ICD-10-PCS | Mod: CPTII,S$GLB,, | Performed by: SPECIALIST

## 2023-11-13 PROCEDURE — 99999 PR PBB SHADOW E&M-EST. PATIENT-LVL III: CPT | Mod: PBBFAC,,, | Performed by: SPECIALIST

## 2023-11-13 RX ORDER — CYCLOBENZAPRINE HCL 10 MG
1 TABLET ORAL NIGHTLY
COMMUNITY
Start: 2023-08-09

## 2023-11-13 RX ORDER — AMLODIPINE BESYLATE 5 MG/1
5 TABLET ORAL
COMMUNITY
Start: 2023-03-14 | End: 2024-03-13

## 2023-11-13 RX ORDER — DICLOFENAC SODIUM 75 MG/1
75 TABLET, DELAYED RELEASE ORAL 2 TIMES DAILY PRN
COMMUNITY
Start: 2023-09-20

## 2023-11-13 NOTE — H&P (VIEW-ONLY)
History & Physical    SUBJECTIVE:     History of Present Illness:  Patient is a 68 y.o. male presents with large right inguinal hernia for repair.  Hernia extends down into right testicle and is non reducible.  Patient with possible left inguinal hernia however, due to the large size of the right sided inguinal hernia and presence of sciatica will defer repair of left side.  Past medical history is significant for hypertension.  Half pack per day of cigarette use since age 14.      Chief Complaint   Patient presents with    Hernia       Review of patient's allergies indicates:  No Known Allergies    Current Outpatient Medications   Medication Sig Dispense Refill    amLODIPine (NORVASC) 5 MG tablet Take 5 mg by mouth.      cyclobenzaprine (FLEXERIL) 10 MG tablet Take 1 tablet by mouth every evening.      diclofenac (VOLTAREN) 75 MG EC tablet Take 75 mg by mouth 2 (two) times daily as needed.      ibuprofen (ADVIL,MOTRIN) 600 MG tablet Take 1 tablet (600 mg total) by mouth every 6 (six) hours as needed for Pain. 20 tablet 0     No current facility-administered medications for this visit.       Past Medical History:   Diagnosis Date    Pneumonia      Past Surgical History:   Procedure Laterality Date    KNEE ARTHROSCOPY       History reviewed. No pertinent family history.  Social History     Tobacco Use    Smoking status: Every Day     Current packs/day: 2.00     Types: Cigarettes    Smokeless tobacco: Never   Substance Use Topics    Alcohol use: Yes     Comment: Socially    Drug use: No        Review of Systems:  Review of Systems   Constitutional: Negative.  Negative for fatigue and fever.   HENT: Negative.  Negative for nosebleeds, sore throat and trouble swallowing.    Eyes: Negative.    Respiratory: Negative.     Cardiovascular: Negative.  Negative for chest pain.   Gastrointestinal: Negative.         Positive right groin pain.  Positive right groin mass   Genitourinary: Negative.    Musculoskeletal:  Positive for  "back pain.   Skin: Negative.    Neurological: Negative.    Psychiatric/Behavioral: Negative.       OBJECTIVE:     Vital Signs (Most Recent)  Pulse: 65 (11/13/23 1042)  BP: (!) 160/81 (11/13/23 1042)  SpO2: 100 % (11/13/23 1042)  5' 11" (1.803 m)  64.4 kg (142 lb)     Physical Exam:  Physical Exam  Constitutional:       Appearance: He is well-developed.   HENT:      Head: Normocephalic and atraumatic.      Right Ear: External ear normal.      Left Ear: External ear normal.   Eyes:      Pupils: Pupils are equal, round, and reactive to light.   Cardiovascular:      Rate and Rhythm: Normal rate and regular rhythm.      Heart sounds: Normal heart sounds.   Pulmonary:      Breath sounds: Normal breath sounds.   Abdominal:      General: Bowel sounds are normal.      Palpations: Abdomen is soft.      Comments: Large non reducible right inguinal hernia extending into scrotum   Musculoskeletal:         General: Normal range of motion.      Cervical back: Neck supple.   Skin:     General: Skin is warm and dry.   Neurological:      Mental Status: He is alert and oriented to person, place, and time.       ASSESSMENT/PLAN:     Large symptomatic right inguinal hernia for repair.  Consents reviewed in detail with patient and signed.       "

## 2023-11-17 ENCOUNTER — HOSPITAL ENCOUNTER (OUTPATIENT)
Dept: PREADMISSION TESTING | Facility: OTHER | Age: 68
Discharge: HOME | End: 2023-11-17
Attending: SPECIALIST
Payer: COMMERCIAL

## 2023-11-17 ENCOUNTER — ANESTHESIA EVENT (OUTPATIENT)
Dept: SURGERY | Facility: OTHER | Age: 68
End: 2023-11-17
Payer: COMMERCIAL

## 2023-11-17 VITALS
SYSTOLIC BLOOD PRESSURE: 141 MMHG | RESPIRATION RATE: 19 BRPM | BODY MASS INDEX: 19.88 KG/M2 | WEIGHT: 142 LBS | HEIGHT: 71 IN | OXYGEN SATURATION: 100 % | HEART RATE: 81 BPM | TEMPERATURE: 98 F | DIASTOLIC BLOOD PRESSURE: 73 MMHG

## 2023-11-17 DIAGNOSIS — Z01.818 PREOP TESTING: ICD-10-CM

## 2023-11-17 DIAGNOSIS — Z01.818 PREOP TESTING: Primary | ICD-10-CM

## 2023-11-17 LAB
ANION GAP SERPL CALC-SCNC: 8 MMOL/L (ref 8–16)
BASOPHILS # BLD AUTO: 0.04 K/UL (ref 0–0.2)
BASOPHILS NFR BLD: 0.6 % (ref 0–1.9)
BUN SERPL-MCNC: 14 MG/DL (ref 8–23)
CALCIUM SERPL-MCNC: 9 MG/DL (ref 8.7–10.5)
CHLORIDE SERPL-SCNC: 105 MMOL/L (ref 95–110)
CO2 SERPL-SCNC: 25 MMOL/L (ref 23–29)
CREAT SERPL-MCNC: 1.1 MG/DL (ref 0.5–1.4)
DIFFERENTIAL METHOD: ABNORMAL
EOSINOPHIL # BLD AUTO: 0.1 K/UL (ref 0–0.5)
EOSINOPHIL NFR BLD: 1.6 % (ref 0–8)
ERYTHROCYTE [DISTWIDTH] IN BLOOD BY AUTOMATED COUNT: 12.7 % (ref 11.5–14.5)
EST. GFR  (NO RACE VARIABLE): >60 ML/MIN/1.73 M^2
GLUCOSE SERPL-MCNC: 93 MG/DL (ref 70–110)
HCT VFR BLD AUTO: 34.7 % (ref 40–54)
HGB BLD-MCNC: 10.8 G/DL (ref 14–18)
IMM GRANULOCYTES # BLD AUTO: 0.03 K/UL (ref 0–0.04)
IMM GRANULOCYTES NFR BLD AUTO: 0.5 % (ref 0–0.5)
LYMPHOCYTES # BLD AUTO: 2.1 K/UL (ref 1–4.8)
LYMPHOCYTES NFR BLD: 34.2 % (ref 18–48)
MCH RBC QN AUTO: 28.6 PG (ref 27–31)
MCHC RBC AUTO-ENTMCNC: 31.1 G/DL (ref 32–36)
MCV RBC AUTO: 92 FL (ref 82–98)
MONOCYTES # BLD AUTO: 1.2 K/UL (ref 0.3–1)
MONOCYTES NFR BLD: 19.7 % (ref 4–15)
NEUTROPHILS # BLD AUTO: 2.7 K/UL (ref 1.8–7.7)
NEUTROPHILS NFR BLD: 43.4 % (ref 38–73)
NRBC BLD-RTO: 0 /100 WBC
PLATELET # BLD AUTO: 265 K/UL (ref 150–450)
PMV BLD AUTO: 8.3 FL (ref 9.2–12.9)
POTASSIUM SERPL-SCNC: 4.6 MMOL/L (ref 3.5–5.1)
RBC # BLD AUTO: 3.77 M/UL (ref 4.6–6.2)
SODIUM SERPL-SCNC: 138 MMOL/L (ref 136–145)
WBC # BLD AUTO: 6.2 K/UL (ref 3.9–12.7)

## 2023-11-17 PROCEDURE — 36415 COLL VENOUS BLD VENIPUNCTURE: CPT | Performed by: ANESTHESIOLOGY

## 2023-11-17 PROCEDURE — 80048 BASIC METABOLIC PNL TOTAL CA: CPT | Performed by: ANESTHESIOLOGY

## 2023-11-17 PROCEDURE — 85025 COMPLETE CBC W/AUTO DIFF WBC: CPT | Performed by: ANESTHESIOLOGY

## 2023-11-17 PROCEDURE — 93010 ELECTROCARDIOGRAM REPORT: CPT | Mod: ,,, | Performed by: INTERNAL MEDICINE

## 2023-11-17 PROCEDURE — 93010 EKG 12-LEAD: ICD-10-PCS | Mod: ,,, | Performed by: INTERNAL MEDICINE

## 2023-11-17 PROCEDURE — 93005 ELECTROCARDIOGRAM TRACING: CPT

## 2023-11-17 RX ORDER — LIDOCAINE HYDROCHLORIDE 10 MG/ML
0.5 INJECTION, SOLUTION EPIDURAL; INFILTRATION; INTRACAUDAL; PERINEURAL ONCE
Status: CANCELLED | OUTPATIENT
Start: 2023-11-17 | End: 2023-11-17

## 2023-11-17 RX ORDER — SODIUM CHLORIDE, SODIUM LACTATE, POTASSIUM CHLORIDE, CALCIUM CHLORIDE 600; 310; 30; 20 MG/100ML; MG/100ML; MG/100ML; MG/100ML
INJECTION, SOLUTION INTRAVENOUS CONTINUOUS
Status: CANCELLED | OUTPATIENT
Start: 2023-11-17

## 2023-11-17 RX ORDER — ACETAMINOPHEN 500 MG
1000 TABLET ORAL
Status: CANCELLED | OUTPATIENT
Start: 2023-11-17 | End: 2023-11-17

## 2023-11-17 NOTE — DISCHARGE INSTRUCTIONS
Information to Prepare you for your Surgery    PRE-ADMIT TESTING -  [phone]    [address] [facility]          Your surgery has been scheduled at [facility]. We are pleased to have the opportunity to serve you. For Further Information please call [phone].    On the day of surgery please report to the Information Desk on the 1st floor.    CONTACT YOUR PHYSICIAN'S OFFICE THE DAY PRIOR TO YOUR SURGERY TO OBTAIN YOUR ARRIVAL TIME.     The evening before surgery do not eat anything after 9 p.m. ( this includes hard candy, chewing gum and mints).  You may only have GATORADE, POWERADE AND WATER  from 9 p.m. until you leave your home.   DO NOT DRINK ANY LIQUIDS ON THE WAY TO THE HOSPITAL.      Why does your anesthesiologist allow you to drink Gatorade/Powerade before surgery?  Gatorade/Powerade helps to increase your comfort before surgery and to decrease your nausea after surgery. The carbohydrates in Gatorade/Powerade help reduce your body's stress response to surgery.  If you are a diabetic-drink only water prior to surgery.    Outpatient Surgery- May allow 2 adult (18 and older) Support Persons (1 being the designated ) for all surgical/procedural patients. A breastfeeding mother will be allowed her infant and 2 adult Support Persons. No one under the age of 18 will be allowed in the building.      SPECIAL MEDICATION INSTRUCTIONS: TAKE medications checked off by the Anesthesiologist on your Medication List.    Angiogram Patients: Take medications as instructed by your physician, including aspirin.     Surgery Patients:    If you take ASPIRIN - Your PHYSICIAN/SURGEON will need to inform you IF/OR when you need to stop taking aspirin prior to your surgery.     The week prior to surgery do not ot take any medications containing IBUPROFEN or NSAIDS ( Advil, Motrin, Goodys, BC, Aleve, Naproxen etc) If you are not sure if you should take a medicine  please call your surgeon's office.  Ok to take Tylenol    Do Not Wear any make-up (especially eye make-up) to surgery. Please remove any false eyelashes or eyelash extensions. If you arrive the day of surgery with makeup/eyelashes on you will be required to remove prior to surgery. (There is a risk of corneal abrasions if eye makeup/eyelash extensions are not removed)      Leave all valuables at home.   Do Not wear any jewelry or watches, including any metal in body piercings. Jewelry must be removed prior to coming to the hospital.  There is a possibility that rings that are unable to be removed may be cut off if they are on the surgical extremity.    Please remove all hair extensions, wigs, clips and any other metal accessories/ ornaments from your hair.  These items may pose a flammable/fire risk in Surgery and must be removed.    Do not shave your surgical area at least 5 days prior to your surgery. The surgical prep will be performed at the hospital according to Infection Control regulations.    Contact Lens must be removed before surgery. Either do not wear the contact lens or bring a case and solution for storage.  Please bring a container for eyeglasses or dentures as required.  Bring any paperwork your physician has provided, such as consent forms,  history and physicals, doctor's orders, etc.   Bring comfortable clothes that are loose fitting to wear upon discharge. Take into consideration the type of surgery being performed.  Maintain your diet as advised per your physician the day prior to surgery.      Adequate rest the night before surgery is advised.   Park in the Parking lot behind the hospital or in the [facility] across the street from the parking lot. Parking is complimentary.  If you will be discharged the same day as your procedure, please arrange for a responsible adult to drive you home or to accompany you if traveling by taxi.   YOU WILL NOT BE PERMITTED TO DRIVE OR TO LEAVE THE  HOSPITAL ALONE AFTER SURGERY.   If you are being discharged the same day, it is strongly recommended that you arrange for someone to remain with you for the first 24 hrs following your surgery.    The Surgeon will speak to your family/visitor after your surgery regarding the outcome of your surgery and post op care.  The Surgeon may speak to you after your surgery, but there is a possibility you may not remember the details.  Please check with your family members regarding the conversation with the Surgeon.    We strongly recommend whoever is bringing you home be present for discharge instructions.  This will ensure a thorough understanding for your post op home care.          Thank you for your cooperation.  The Staff of [facility].            Bathing Instructions with Hibiclens    Shower the evening before and morning of your procedure with Chlorhexidine (Hibiclens)  do not use Chlorhexidine on your face or genitals. Do not get in your eyes.  Wash your face with water and your regular face wash/soap  Use your regular shampoo  Apply Chlorhexidine (Hibiclens) directly on your skin or on a wet washcloth and wash gently. When showering: Move away from the shower stream when applying Chlorhexidine (Hibiclens) to avoid rinsing off too soon.  Rinse thoroughly with warm water  Do not dilute Chlorhexidine (Hibiclens)   Dry off as usual, do not use any deodorant, powder, body lotions, perfume, after shave or cologne.

## 2023-11-17 NOTE — ANESTHESIA PREPROCEDURE EVALUATION
11/17/2023  Linden Spencer is a 68 y.o., male.      Pre-op Assessment    I have reviewed the Patient Summary Reports.     I have reviewed the Nursing Notes. I have reviewed the NPO Status.   I have reviewed the Medications.     Review of Systems  Anesthesia Hx:             Denies Family Hx of Anesthesia complications.    Denies Personal Hx of Anesthesia complications.                    Social:  Smoker <1ppd      Hematology/Oncology:  Hematology Normal   Oncology Normal                                   EENT/Dental:  EENT/Dental Normal           Cardiovascular:     Hypertension              ECG has been reviewed. EKG SB 58, LVH                         Pulmonary:       Recent URI (no fever, dry cough, improving)                 Renal/:  Renal/ Normal                 Hepatic/GI:  Hepatic/GI Normal                 Musculoskeletal:     R sciatic pain       Spine Disorders: lumbar            Neurological:  Neurology Normal                                      Endocrine:  Endocrine Normal            Dermatological:  Skin Normal    Psych:  Psychiatric Normal                    Physical Exam  General: Cooperative, Alert and Oriented    Airway:  Mallampati: II   Mouth Opening: Normal  TM Distance: Normal  Neck ROM: Normal ROM    Dental:  Dentures  Multiple missing teeth, few loose      Anesthesia Plan  Type of Anesthesia, risks & benefits discussed:    Anesthesia Type: Regional  Intra-op Monitoring Plan: Standard ASA Monitors  Post Op Pain Control Plan: multimodal analgesia  Informed Consent: Informed consent signed with the Patient and all parties understand the risks and agree with anesthesia plan.  All questions answered.   ASA Score: 3  Anesthesia Plan Notes: Lab today    Ready For Surgery From Anesthesia Perspective.     .

## 2023-11-22 ENCOUNTER — HOSPITAL ENCOUNTER (OUTPATIENT)
Facility: OTHER | Age: 68
Discharge: HOME | End: 2023-11-22
Attending: SPECIALIST | Admitting: SPECIALIST
Payer: COMMERCIAL

## 2023-11-22 ENCOUNTER — ANESTHESIA (OUTPATIENT)
Dept: SURGERY | Facility: OTHER | Age: 68
End: 2023-11-22
Payer: COMMERCIAL

## 2023-11-22 VITALS
DIASTOLIC BLOOD PRESSURE: 78 MMHG | HEART RATE: 60 BPM | OXYGEN SATURATION: 96 % | SYSTOLIC BLOOD PRESSURE: 166 MMHG | TEMPERATURE: 98 F | RESPIRATION RATE: 16 BRPM

## 2023-11-22 DIAGNOSIS — K40.90 RIGHT INGUINAL HERNIA: ICD-10-CM

## 2023-11-22 PROCEDURE — 63600175 PHARM REV CODE 636 W HCPCS: Performed by: ANESTHESIOLOGY

## 2023-11-22 PROCEDURE — D9220A PRA ANESTHESIA: ICD-10-PCS | Mod: CRNA,,, | Performed by: STUDENT IN AN ORGANIZED HEALTH CARE EDUCATION/TRAINING PROGRAM

## 2023-11-22 PROCEDURE — 63600175 PHARM REV CODE 636 W HCPCS: Performed by: SPECIALIST

## 2023-11-22 PROCEDURE — D9220A PRA ANESTHESIA: Mod: ANES,,, | Performed by: ANESTHESIOLOGY

## 2023-11-22 PROCEDURE — 63600175 PHARM REV CODE 636 W HCPCS: Performed by: NURSE ANESTHETIST, CERTIFIED REGISTERED

## 2023-11-22 PROCEDURE — 25000003 PHARM REV CODE 250: Performed by: SPECIALIST

## 2023-11-22 PROCEDURE — 71000016 HC POSTOP RECOV ADDL HR: Performed by: SPECIALIST

## 2023-11-22 PROCEDURE — C1781 MESH (IMPLANTABLE): HCPCS | Performed by: SPECIALIST

## 2023-11-22 PROCEDURE — 49505 PR REPAIR ING HERNIA,5+Y/O,REDUCIBL: ICD-10-PCS | Mod: RT,,, | Performed by: SPECIALIST

## 2023-11-22 PROCEDURE — 88302 TISSUE EXAM BY PATHOLOGIST: CPT | Performed by: PATHOLOGY

## 2023-11-22 PROCEDURE — 25000003 PHARM REV CODE 250: Performed by: STUDENT IN AN ORGANIZED HEALTH CARE EDUCATION/TRAINING PROGRAM

## 2023-11-22 PROCEDURE — 88302 TISSUE EXAM BY PATHOLOGIST: CPT | Mod: 26,,, | Performed by: PATHOLOGY

## 2023-11-22 PROCEDURE — D9220A PRA ANESTHESIA: Mod: CRNA,,, | Performed by: STUDENT IN AN ORGANIZED HEALTH CARE EDUCATION/TRAINING PROGRAM

## 2023-11-22 PROCEDURE — C1729 CATH, DRAINAGE: HCPCS | Performed by: SPECIALIST

## 2023-11-22 PROCEDURE — 37000009 HC ANESTHESIA EA ADD 15 MINS: Performed by: SPECIALIST

## 2023-11-22 PROCEDURE — 36000706: Performed by: SPECIALIST

## 2023-11-22 PROCEDURE — 71000015 HC POSTOP RECOV 1ST HR: Performed by: SPECIALIST

## 2023-11-22 PROCEDURE — 25000003 PHARM REV CODE 250: Performed by: ANESTHESIOLOGY

## 2023-11-22 PROCEDURE — 36000707: Performed by: SPECIALIST

## 2023-11-22 PROCEDURE — 71000033 HC RECOVERY, INTIAL HOUR: Performed by: SPECIALIST

## 2023-11-22 PROCEDURE — D9220A PRA ANESTHESIA: ICD-10-PCS | Mod: ANES,,, | Performed by: ANESTHESIOLOGY

## 2023-11-22 PROCEDURE — 88302 PR  SURG PATH,LEVEL II: ICD-10-PCS | Mod: 26,,, | Performed by: PATHOLOGY

## 2023-11-22 PROCEDURE — 37000008 HC ANESTHESIA 1ST 15 MINUTES: Performed by: SPECIALIST

## 2023-11-22 PROCEDURE — 49505 PRP I/HERN INIT REDUC >5 YR: CPT | Mod: RT,,, | Performed by: SPECIALIST

## 2023-11-22 PROCEDURE — 63600175 PHARM REV CODE 636 W HCPCS: Performed by: STUDENT IN AN ORGANIZED HEALTH CARE EDUCATION/TRAINING PROGRAM

## 2023-11-22 PROCEDURE — 76942 ECHO GUIDE FOR BIOPSY: CPT | Performed by: ANESTHESIOLOGY

## 2023-11-22 PROCEDURE — C9290 INJ, BUPIVACAINE LIPOSOME: HCPCS | Performed by: SPECIALIST

## 2023-11-22 RX ORDER — DIPHENHYDRAMINE HYDROCHLORIDE 50 MG/ML
25 INJECTION INTRAMUSCULAR; INTRAVENOUS EVERY 6 HOURS PRN
Status: DISCONTINUED | OUTPATIENT
Start: 2023-11-22 | End: 2023-11-22 | Stop reason: HOSPADM

## 2023-11-22 RX ORDER — LIDOCAINE HYDROCHLORIDE 10 MG/ML
1 INJECTION, SOLUTION EPIDURAL; INFILTRATION; INTRACAUDAL; PERINEURAL ONCE
Status: DISCONTINUED | OUTPATIENT
Start: 2023-11-22 | End: 2023-11-22 | Stop reason: HOSPADM

## 2023-11-22 RX ORDER — LIDOCAINE HYDROCHLORIDE 20 MG/ML
INJECTION INTRAVENOUS
Status: DISCONTINUED | OUTPATIENT
Start: 2023-11-22 | End: 2023-11-22

## 2023-11-22 RX ORDER — HYDROMORPHONE HYDROCHLORIDE 2 MG/ML
0.4 INJECTION, SOLUTION INTRAMUSCULAR; INTRAVENOUS; SUBCUTANEOUS EVERY 5 MIN PRN
Status: DISCONTINUED | OUTPATIENT
Start: 2023-11-22 | End: 2023-11-22 | Stop reason: HOSPADM

## 2023-11-22 RX ORDER — PROPOFOL 10 MG/ML
VIAL (ML) INTRAVENOUS CONTINUOUS PRN
Status: DISCONTINUED | OUTPATIENT
Start: 2023-11-22 | End: 2023-11-22

## 2023-11-22 RX ORDER — FENTANYL CITRATE 50 UG/ML
INJECTION, SOLUTION INTRAMUSCULAR; INTRAVENOUS
Status: DISCONTINUED | OUTPATIENT
Start: 2023-11-22 | End: 2023-11-22

## 2023-11-22 RX ORDER — OXYCODONE HYDROCHLORIDE 5 MG/1
5 TABLET ORAL
Status: DISCONTINUED | OUTPATIENT
Start: 2023-11-22 | End: 2023-11-22 | Stop reason: HOSPADM

## 2023-11-22 RX ORDER — ROPIVACAINE HYDROCHLORIDE 5 MG/ML
INJECTION, SOLUTION EPIDURAL; INFILTRATION; PERINEURAL
Status: COMPLETED | OUTPATIENT
Start: 2023-11-22 | End: 2023-11-22

## 2023-11-22 RX ORDER — ACETAMINOPHEN 500 MG
1000 TABLET ORAL
Status: COMPLETED | OUTPATIENT
Start: 2023-11-22 | End: 2023-11-22

## 2023-11-22 RX ORDER — SODIUM CHLORIDE 0.9 % (FLUSH) 0.9 %
3 SYRINGE (ML) INJECTION
Status: DISCONTINUED | OUTPATIENT
Start: 2023-11-22 | End: 2023-11-22 | Stop reason: HOSPADM

## 2023-11-22 RX ORDER — PROPOFOL 10 MG/ML
VIAL (ML) INTRAVENOUS
Status: DISCONTINUED | OUTPATIENT
Start: 2023-11-22 | End: 2023-11-22

## 2023-11-22 RX ORDER — LIDOCAINE HYDROCHLORIDE 10 MG/ML
0.5 INJECTION, SOLUTION EPIDURAL; INFILTRATION; INTRACAUDAL; PERINEURAL ONCE
Status: DISCONTINUED | OUTPATIENT
Start: 2023-11-22 | End: 2023-11-22 | Stop reason: HOSPADM

## 2023-11-22 RX ORDER — MIDAZOLAM HYDROCHLORIDE 1 MG/ML
INJECTION INTRAMUSCULAR; INTRAVENOUS
Status: DISCONTINUED | OUTPATIENT
Start: 2023-11-22 | End: 2023-11-22

## 2023-11-22 RX ORDER — PROCHLORPERAZINE EDISYLATE 5 MG/ML
5 INJECTION INTRAMUSCULAR; INTRAVENOUS EVERY 30 MIN PRN
Status: DISCONTINUED | OUTPATIENT
Start: 2023-11-22 | End: 2023-11-22 | Stop reason: HOSPADM

## 2023-11-22 RX ORDER — MEPERIDINE HYDROCHLORIDE 25 MG/ML
12.5 INJECTION INTRAMUSCULAR; INTRAVENOUS; SUBCUTANEOUS ONCE AS NEEDED
Status: DISCONTINUED | OUTPATIENT
Start: 2023-11-22 | End: 2023-11-22 | Stop reason: HOSPADM

## 2023-11-22 RX ORDER — SODIUM CHLORIDE 9 MG/ML
INJECTION, SOLUTION INTRAVENOUS CONTINUOUS
Status: DISCONTINUED | OUTPATIENT
Start: 2023-11-22 | End: 2023-11-22 | Stop reason: HOSPADM

## 2023-11-22 RX ORDER — CEFAZOLIN SODIUM 1 G/3ML
2 INJECTION, POWDER, FOR SOLUTION INTRAMUSCULAR; INTRAVENOUS
Status: DISCONTINUED | OUTPATIENT
Start: 2023-11-22 | End: 2023-11-22 | Stop reason: HOSPADM

## 2023-11-22 RX ORDER — HYDROCODONE BITARTRATE AND ACETAMINOPHEN 7.5; 325 MG/1; MG/1
1 TABLET ORAL EVERY 6 HOURS PRN
Qty: 28 TABLET | Refills: 0 | Status: SHIPPED | OUTPATIENT
Start: 2023-11-22

## 2023-11-22 RX ORDER — SODIUM CHLORIDE, SODIUM LACTATE, POTASSIUM CHLORIDE, CALCIUM CHLORIDE 600; 310; 30; 20 MG/100ML; MG/100ML; MG/100ML; MG/100ML
INJECTION, SOLUTION INTRAVENOUS CONTINUOUS
Status: DISCONTINUED | OUTPATIENT
Start: 2023-11-22 | End: 2023-11-22 | Stop reason: HOSPADM

## 2023-11-22 RX ORDER — BUPIVACAINE HCL/EPINEPHRINE 0.25-.0005
VIAL (ML) INJECTION
Status: DISCONTINUED | OUTPATIENT
Start: 2023-11-22 | End: 2023-11-22 | Stop reason: HOSPADM

## 2023-11-22 RX ORDER — CEFAZOLIN SODIUM 1 G/3ML
INJECTION, POWDER, FOR SOLUTION INTRAMUSCULAR; INTRAVENOUS
Status: DISCONTINUED | OUTPATIENT
Start: 2023-11-22 | End: 2023-11-22

## 2023-11-22 RX ADMIN — ROPIVACAINE HYDROCHLORIDE 20 ML: 5 INJECTION, SOLUTION EPIDURAL; INFILTRATION; PERINEURAL at 07:11

## 2023-11-22 RX ADMIN — CEFAZOLIN 2 G: 330 INJECTION, POWDER, FOR SOLUTION INTRAMUSCULAR; INTRAVENOUS at 08:11

## 2023-11-22 RX ADMIN — GLYCOPYRROLATE 0.2 MG: 0.2 INJECTION, SOLUTION INTRAMUSCULAR; INTRAVITREAL at 08:11

## 2023-11-22 RX ADMIN — ACETAMINOPHEN 1000 MG: 500 TABLET ORAL at 06:11

## 2023-11-22 RX ADMIN — LIDOCAINE HYDROCHLORIDE 100 MG: 20 INJECTION, SOLUTION INTRAVENOUS at 08:11

## 2023-11-22 RX ADMIN — PROPOFOL 100 MCG/KG/MIN: 10 INJECTION, EMULSION INTRAVENOUS at 08:11

## 2023-11-22 RX ADMIN — FENTANYL CITRATE 50 MCG: 50 INJECTION, SOLUTION INTRAMUSCULAR; INTRAVENOUS at 07:11

## 2023-11-22 RX ADMIN — PROPOFOL 50 MG: 10 INJECTION, EMULSION INTRAVENOUS at 08:11

## 2023-11-22 RX ADMIN — HYDROMORPHONE HYDROCHLORIDE 0.4 MG: 2 INJECTION INTRAMUSCULAR; INTRAVENOUS; SUBCUTANEOUS at 10:11

## 2023-11-22 RX ADMIN — OXYCODONE HYDROCHLORIDE 5 MG: 5 TABLET ORAL at 09:11

## 2023-11-22 RX ADMIN — MIDAZOLAM HYDROCHLORIDE 2 MG: 1 INJECTION, SOLUTION INTRAMUSCULAR; INTRAVENOUS at 07:11

## 2023-11-22 RX ADMIN — SODIUM CHLORIDE, SODIUM LACTATE, POTASSIUM CHLORIDE, AND CALCIUM CHLORIDE: 600; 310; 30; 20 INJECTION, SOLUTION INTRAVENOUS at 07:11

## 2023-11-22 NOTE — ANESTHESIA POSTPROCEDURE EVALUATION
Anesthesia Post Evaluation    Patient: Linden Spencer    Procedure(s) Performed: Procedure(s) (LRB):  REPAIR, HERNIA, INGUINAL, WITHOUT HISTORY OF PRIOR REPAIR, AGE 5 YEARS OR OLDER (Right)    Final Anesthesia Type: regional      Patient location during evaluation: PACU  Patient participation: Yes- Able to Participate  Level of consciousness: awake and alert  Post-procedure vital signs: reviewed and stable  Pain management: adequate  Airway patency: patent    PONV status at discharge: No PONV  Anesthetic complications: no      Cardiovascular status: blood pressure returned to baseline  Respiratory status: unassisted  Hydration status: euvolemic  Follow-up not needed.          Vitals Value Taken Time   /75 11/22/23 1030   Temp 36.8 °C (98.2 °F) 11/22/23 1030   Pulse 56 11/22/23 1030   Resp 18 11/22/23 1030   SpO2 96 % 11/22/23 1030         Event Time   Out of Recovery 10:24:53         Pain/Geetha Score: Pain Rating Prior to Med Admin: 7 (11/22/2023 10:15 AM)  Pain Rating Post Med Admin: 3 (states tolerable. Falls off to sleep.) (11/22/2023 10:04 AM)  Geetha Score: 10 (11/22/2023 10:30 AM)

## 2023-11-22 NOTE — OP NOTE
[facility] ([place])  Operative Note    SUMMARY     Surgery Date: 11/22/2023     Surgeon(s) and Role:     * Endy Duran Jr., MD - Primary    Assisting Surgeon: None    Pre-op Diagnosis:  RIGHT INGUNINAL HERNIA    Post-op Diagnosis:  Post-Op Diagnosis Codes:     * Internal inguinal hernia [K40.90]    Procedure(s) (LRB):  REPAIR, HERNIA, INGUINAL, WITHOUT HISTORY OF PRIOR REPAIR, AGE 5 YEARS OR OLDER (Right)    Anesthesia: Choice    Description of Procedure:  The patient was brought to the operating room and placed in the supine position.  The abdomen and groin prepped and draped in a sterile fashion.  A transverse incision made over the right inguinal canal.  Using blunt sharp dissection incision was carried down through the subcutaneous tissues and the fascia of the external oblique opened along the bias of the fibers.  The spermatic cord identified and isolated.  There was a large indirect hernia sac present.  Using blunt sharp dissection the sac was freed from surrounding structures and high ligation performed with 3-0 Vicryl ligatures.  Hemostasis obtained with electrocautery Bovie and 3-0 Vicryl ties.  The floor of the inguinal canal then reconstructed with soft Prolene mesh sutured along the ileopubic tract laterally and the conjoined tendon medially.  The mesh was key holed around the spermatic cord and secured with interrupted 0 Ethibond sutures.  After reconstruction of the floor of the inguinal canal the spermatic cord was returned to its normal anatomic position.  The wounds inspected.  The fascia of the external oblique and the subcutaneous tissues closed with running 3-0 Vicryl.  The skin closed with running 4-0 Monocryl and the wounds sterilely dressed.  The patient tolerated the procedure well left the operating in good condition.  At the end of the procedure all sponge lap and instrument counts were correct.  Estimated blood loss-30 cc    Estimated Blood Loss:  30 cc         Specimens:    Specimen (24h ago, onward)       Start     Ordered    11/22/23 0834  Specimen to Pathology, Surgery General Surgery  Once        Comments: Pre-op Diagnosis: RIGHT INGUNINAL HERNIAProcedure(s):REPAIR, HERNIA, INGUINAL, WITHOUT HISTORY OF PRIOR REPAIR, AGE 5 YEARS OR OLDER Number of specimens: 1Name of specimens: 1 hernia sac     References:    Click here for ordering Quick Tip   Question Answer Comment   Procedure Type: General Surgery    Which provider would you like to cc? JAY CHAN JR    Release to patient Immediate        11/22/23 0834                        SUMMARY     Admit Date:     Discharge Date and Time:  11/22/2023 9:44 AM    Hospital Course (synopsis of major diagnoses, care, treatment, and services provided during the course of the hospital stay):  Benign     Final Diagnosis: Post-Op Diagnosis Codes:     * Internal inguinal hernia [K40.90]    Disposition: Home or Self Care    Follow Up/Patient Instructions:  Office 10-14 days    Medications:  Reconciled Home Medications:      Medication List        START taking these medications      HYDROcodone-acetaminophen 7.5-325 mg per tablet  Commonly known as: NORCO  Take 1 tablet by mouth every 6 (six) hours as needed for Pain.            CONTINUE taking these medications      amLODIPine 5 MG tablet  Commonly known as: NORVASC  Take 5 mg by mouth.     cyclobenzaprine 10 MG tablet  Commonly known as: FLEXERIL  Take 1 tablet by mouth every evening.     diclofenac 75 MG EC tablet  Commonly known as: VOLTAREN  Take 75 mg by mouth 2 (two) times daily as needed.     ibuprofen 600 MG tablet  Commonly known as: ADVIL,MOTRIN  Take 1 tablet (600 mg total) by mouth every 6 (six) hours as needed for Pain.            Discharge Procedure Orders   Diet general     Call MD for:  temperature >100.4     Call MD for:  persistent nausea and vomiting     Call MD for:  severe uncontrolled pain     Call MD for:  redness, tenderness, or signs of infection (pain, swelling,  redness, odor or green/yellow discharge around incision site)     Lifting restrictions   Order Comments: 15 lb     Wound care routine (specify)   Order Comments: Wound care routine:  Leave Prineo intact  May shower  Ice packs      Follow-up Information       Endy Duran Jr., MD Follow up in 10 day(s).    Specialties: General Surgery, Vascular Surgery  Contact information:  [address] [id number] [phone]

## 2023-11-22 NOTE — ANESTHESIA PROCEDURE NOTES
Right TAP    Patient location during procedure: holding area    Reason for block: primary anesthetic    Diagnosis: Right inguinal hernia   Start time: 11/22/2023 7:31 AM  Timeout: 11/22/2023 7:23 AM   End time: 11/22/2023 7:37 AM    Staffing  Authorizing Provider: Martínez Flores MD  Performing Provider: Martínez Flores MD    Staffing  Performed by: Martínez Flores MD  Authorized by: Martínez Flores MD    Preanesthetic Checklist  Completed: patient identified, IV checked, site marked, surgical consent, monitors and equipment checked, pre-op evaluation and timeout performed  Peripheral Block  Patient position: supine  Prep: ChloraPrep and site prepped and draped  Patient monitoring: heart rate and continuous pulse ox  Block type: TAP  Laterality: right  Injection technique: single shot  Needle  Needle type: Echogenic   Needle gauge: 20 G  Needle length: 4 in  Needle localization: anatomical landmarks and ultrasound guidance   -ultrasound image captured on disc.  Assessment  Injection assessment: negative aspiration, negative parasthesia and local visualized surrounding nerve  Paresthesia pain: none  Heart rate change: no  Slow fractionated injection: yes  Pain Tolerance: comfortable throughout block and no complaints  Medications:    Medications: ropivacaine (NAROPIN) injection 0.5% - Perineural   20 mL - 11/22/2023 7:37:00 AM    Additional Notes  Local visualized to spread between internal oblique and transversus abdominis

## 2023-11-22 NOTE — TRANSFER OF CARE
Anesthesia Transfer of Care Note    Patient: Linden Spencer    Procedure(s) Performed: Procedure(s) (LRB):  REPAIR, HERNIA, INGUINAL, WITHOUT HISTORY OF PRIOR REPAIR, AGE 5 YEARS OR OLDER (Right)    Patient location: PACU    Anesthesia Type: regional    Transport from OR: Transported from OR on 6-10 L/min O2 by face mask with adequate spontaneous ventilation    Post pain: adequate analgesia    Post assessment: no apparent anesthetic complications and tolerated procedure well    Post vital signs: stable    Level of consciousness: responds to stimulation    Nausea/Vomiting: no nausea/vomiting    Complications: none    Transfer of care protocol was followed      Last vitals: Visit Vitals  BP (!) 146/72 (BP Location: Right arm, Patient Position: Lying)   Pulse 67   Temp 37.1 °C (98.7 °F) (Oral)   Resp 18   SpO2 100%

## 2023-11-22 NOTE — DISCHARGE INSTRUCTIONS
Your Surgeon Gave You EXPAREL® (bupivacaine liposome injectable suspension)    To help control your pain after surgery, your surgeon injected EXPAREL into your surgical incision just before the end of the procedure.   EXPAREL is a local analgesic that contains the local anesthetic bupivacaine. Local anesthetics provide pain relief by numbing the tissue  around the surgical site.   EXPAREL is specifically designed to release pain medication over time and can control pain for up to 72 hours.   In addition to EXPAREL, your surgeon may provide other pain medications to control your pain.   Each patient is different and responds differently to painmedication. Depending on how you respond to EXPAREL, you may require less  additional pain medication during your recovery.    Possible Side Effects    Side effects can occur with any medication and it is important not to ignore anything you may be experiencing. Some patients who  received EXPAREL experienced nausea, vomiting, or constipation. Rarely, patients who receive bupivacaine (the active ingredient in  EXPAREL) have experienced numbness and tingling in their mouth or lips, lightheadedness, or anxiety. Speak with your doctor right  away if you think you may be experiencing any of these sensations, or if you have other questions regarding possible side effects.    Your Recovery    When your pain is under control, your body can better focus on healing. This is not the time to test your pain tolerance, or grin and  bear it.Work with your surgeon and nurse to make your recovery as speedy and pain-free as possible.   Follow the post-op orders your nurse gave you.   Eat a healthy diet and drink plenty of water. Surgery stresses your body; your body responds by needing more energy to heal.      Important Information About EXPAREL  Products that contain bupivacaine, like EXPAREL, may cause a temporary loss of  sensation or the ability to move in the area where bupivacaine  was injected.    Date Administered: 11/22/23  Time Administered: 08:25AM    Other Forms of Bupivicaine should not be administered within 96hrs following administration of EXPAREL.

## 2023-11-22 NOTE — ANESTHESIA PROCEDURE NOTES
Right ilioinguinal    Patient location during procedure: holding area    Reason for block: primary anesthetic    Diagnosis: Right inguinal hernia   Timeout: 11/22/2023 7:23 AM   End time: 11/22/2023 7:30 AM    Staffing  Authorizing Provider: Martínez Flores MD  Performing Provider: Martínez Flores MD    Staffing  Performed by: Martínez Flores MD  Authorized by: Martínez Flores MD    Preanesthetic Checklist  Completed: patient identified, IV checked, site marked, risks and benefits discussed, surgical consent, monitors and equipment checked, pre-op evaluation and timeout performed  Peripheral Block  Patient position: supine  Prep: ChloraPrep and site prepped and draped  Patient monitoring: heart rate and continuous pulse ox  Block type: ilioinguinal/iliohypogastric  Laterality: right  Injection technique: single shot  Needle  Needle type: Echogenic   Needle gauge: 20 G  Needle length: 4 in  Needle localization: anatomical landmarks and ultrasound guidance   -ultrasound image captured on disc.  Assessment  Injection assessment: negative aspiration, negative parasthesia and local visualized surrounding nerve  Paresthesia pain: none  Heart rate change: no  Slow fractionated injection: yes  Pain Tolerance: comfortable throughout block and no complaints  Medications:    Medications: ropivacaine (NAROPIN) injection 0.5% - Perineural   20 mL - 11/22/2023 7:30:00 AM             What Type Of Note Output Would You Prefer (Optional)?: Standard Output How Severe Is Your Acne?: moderate Is This A New Presentation, Or A Follow-Up?: Acne Females Only: When Was Your Last Menstrual Period?: 09/15/22

## 2023-11-27 LAB
FINAL PATHOLOGIC DIAGNOSIS: NORMAL
GROSS: NORMAL
Lab: NORMAL

## 2023-11-30 ENCOUNTER — OFFICE VISIT (OUTPATIENT)
Dept: SURGERY | Facility: CLINIC | Age: 68
End: 2023-11-30
Attending: SPECIALIST
Payer: COMMERCIAL

## 2023-11-30 VITALS — SYSTOLIC BLOOD PRESSURE: 134 MMHG | HEART RATE: 96 BPM | OXYGEN SATURATION: 100 % | DIASTOLIC BLOOD PRESSURE: 73 MMHG

## 2023-11-30 DIAGNOSIS — K40.90 RIGHT INGUINAL HERNIA: Primary | ICD-10-CM

## 2023-11-30 PROCEDURE — 3288F PR FALLS RISK ASSESSMENT DOCUMENTED: ICD-10-PCS | Mod: CPTII,S$GLB,, | Performed by: SPECIALIST

## 2023-11-30 PROCEDURE — 1101F PT FALLS ASSESS-DOCD LE1/YR: CPT | Mod: CPTII,S$GLB,, | Performed by: SPECIALIST

## 2023-11-30 PROCEDURE — 1159F MED LIST DOCD IN RCRD: CPT | Mod: CPTII,S$GLB,, | Performed by: SPECIALIST

## 2023-11-30 PROCEDURE — 1101F PR PT FALLS ASSESS DOC 0-1 FALLS W/OUT INJ PAST YR: ICD-10-PCS | Mod: CPTII,S$GLB,, | Performed by: SPECIALIST

## 2023-11-30 PROCEDURE — 1125F PR PAIN SEVERITY QUANTIFIED, PAIN PRESENT: ICD-10-PCS | Mod: CPTII,S$GLB,, | Performed by: SPECIALIST

## 2023-11-30 PROCEDURE — 3075F PR MOST RECENT SYSTOLIC BLOOD PRESS GE 130-139MM HG: ICD-10-PCS | Mod: CPTII,S$GLB,, | Performed by: SPECIALIST

## 2023-11-30 PROCEDURE — 3078F PR MOST RECENT DIASTOLIC BLOOD PRESSURE < 80 MM HG: ICD-10-PCS | Mod: CPTII,S$GLB,, | Performed by: SPECIALIST

## 2023-11-30 PROCEDURE — 99024 POSTOP FOLLOW-UP VISIT: CPT | Mod: S$GLB,,, | Performed by: SPECIALIST

## 2023-11-30 PROCEDURE — 99024 PR POST-OP FOLLOW-UP VISIT: ICD-10-PCS | Mod: S$GLB,,, | Performed by: SPECIALIST

## 2023-11-30 PROCEDURE — 1125F AMNT PAIN NOTED PAIN PRSNT: CPT | Mod: CPTII,S$GLB,, | Performed by: SPECIALIST

## 2023-11-30 PROCEDURE — 3078F DIAST BP <80 MM HG: CPT | Mod: CPTII,S$GLB,, | Performed by: SPECIALIST

## 2023-11-30 PROCEDURE — 99999 PR PBB SHADOW E&M-EST. PATIENT-LVL III: CPT | Mod: PBBFAC,,, | Performed by: SPECIALIST

## 2023-11-30 PROCEDURE — 1159F PR MEDICATION LIST DOCUMENTED IN MEDICAL RECORD: ICD-10-PCS | Mod: CPTII,S$GLB,, | Performed by: SPECIALIST

## 2023-11-30 PROCEDURE — 99999 PR PBB SHADOW E&M-EST. PATIENT-LVL III: ICD-10-PCS | Mod: PBBFAC,,, | Performed by: SPECIALIST

## 2023-11-30 PROCEDURE — 3288F FALL RISK ASSESSMENT DOCD: CPT | Mod: CPTII,S$GLB,, | Performed by: SPECIALIST

## 2023-11-30 PROCEDURE — 3075F SYST BP GE 130 - 139MM HG: CPT | Mod: CPTII,S$GLB,, | Performed by: SPECIALIST

## 2023-11-30 NOTE — PROGRESS NOTES
Status post open mesh repair of right inguinal hernia 11/22/2023     Subjective   Some incisional pain     PE   Wounds healing without evidence of recurrence or infection     Impression/plan  Surgically stable   RTC 1    
Impaired functional mobility

## 2024-01-04 ENCOUNTER — OFFICE VISIT (OUTPATIENT)
Dept: SURGERY | Facility: CLINIC | Age: 69
End: 2024-01-04
Attending: SPECIALIST
Payer: COMMERCIAL

## 2024-01-04 VITALS — SYSTOLIC BLOOD PRESSURE: 149 MMHG | DIASTOLIC BLOOD PRESSURE: 86 MMHG | HEART RATE: 63 BPM | OXYGEN SATURATION: 100 %

## 2024-01-04 DIAGNOSIS — K40.90 RIGHT INGUINAL HERNIA: Primary | ICD-10-CM

## 2024-01-04 PROCEDURE — 1159F MED LIST DOCD IN RCRD: CPT | Mod: CPTII,S$GLB,, | Performed by: SPECIALIST

## 2024-01-04 PROCEDURE — 3077F SYST BP >= 140 MM HG: CPT | Mod: CPTII,S$GLB,, | Performed by: SPECIALIST

## 2024-01-04 PROCEDURE — 1126F AMNT PAIN NOTED NONE PRSNT: CPT | Mod: CPTII,S$GLB,, | Performed by: SPECIALIST

## 2024-01-04 PROCEDURE — 3079F DIAST BP 80-89 MM HG: CPT | Mod: CPTII,S$GLB,, | Performed by: SPECIALIST

## 2024-01-04 PROCEDURE — 1160F RVW MEDS BY RX/DR IN RCRD: CPT | Mod: CPTII,S$GLB,, | Performed by: SPECIALIST

## 2024-01-04 PROCEDURE — 3288F FALL RISK ASSESSMENT DOCD: CPT | Mod: CPTII,S$GLB,, | Performed by: SPECIALIST

## 2024-01-04 PROCEDURE — 99999 PR PBB SHADOW E&M-EST. PATIENT-LVL III: CPT | Mod: PBBFAC,,, | Performed by: SPECIALIST

## 2024-01-04 PROCEDURE — 99024 POSTOP FOLLOW-UP VISIT: CPT | Mod: S$GLB,,, | Performed by: SPECIALIST

## 2024-01-04 PROCEDURE — 1101F PT FALLS ASSESS-DOCD LE1/YR: CPT | Mod: CPTII,S$GLB,, | Performed by: SPECIALIST

## 2024-01-04 NOTE — PROGRESS NOTES
68-year-old male status post open mesh repair of large right inguinal hernia on 11/22/2023     Subjective   Some paresthesias near incision    PE   Abdomen-incisions healed, no evidence of infection or hernia recurrence, some localized brawny edema    Impression/plan  Surgically  Stable   RTC 2 months

## 2024-03-07 ENCOUNTER — OFFICE VISIT (OUTPATIENT)
Dept: SURGERY | Facility: CLINIC | Age: 69
End: 2024-03-07
Attending: SPECIALIST
Payer: COMMERCIAL

## 2024-03-07 VITALS
DIASTOLIC BLOOD PRESSURE: 56 MMHG | BODY MASS INDEX: 19.88 KG/M2 | WEIGHT: 142 LBS | SYSTOLIC BLOOD PRESSURE: 98 MMHG | OXYGEN SATURATION: 98 % | HEART RATE: 74 BPM | HEIGHT: 71 IN

## 2024-03-07 DIAGNOSIS — K40.90 RIGHT INGUINAL HERNIA: Primary | ICD-10-CM

## 2024-03-07 PROCEDURE — 99999 PR PBB SHADOW E&M-EST. PATIENT-LVL III: CPT | Mod: PBBFAC,,, | Performed by: SPECIALIST

## 2024-03-07 PROCEDURE — 99499 UNLISTED E&M SERVICE: CPT | Mod: S$GLB,,, | Performed by: SPECIALIST

## 2024-03-07 RX ORDER — FERROUS GLUCONATE 324(37.5)
1 TABLET ORAL EVERY OTHER DAY
COMMUNITY
Start: 2024-02-24 | End: 2024-05-24

## 2024-03-07 RX ORDER — ATORVASTATIN CALCIUM 80 MG/1
1 TABLET, FILM COATED ORAL NIGHTLY
COMMUNITY
Start: 2024-02-22 | End: 2024-05-22

## 2024-03-07 RX ORDER — LISINOPRIL 5 MG/1
2.5 TABLET ORAL
COMMUNITY
Start: 2024-02-28 | End: 2024-03-29

## 2024-03-07 RX ORDER — ASPIRIN 81 MG/1
1 TABLET ORAL DAILY
COMMUNITY
Start: 2024-02-23 | End: 2024-05-23

## 2024-03-07 RX ORDER — NITROGLYCERIN 0.4 MG/1
0.4 TABLET SUBLINGUAL
COMMUNITY
Start: 2024-02-22 | End: 2025-02-21

## 2024-03-07 RX ORDER — ISOSORBIDE DINITRATE 5 MG/1
1 TABLET ORAL 3 TIMES DAILY
COMMUNITY
Start: 2024-03-05 | End: 2025-03-05

## 2024-03-07 RX ORDER — LANOLIN ALCOHOL/MO/W.PET/CERES
1 CREAM (GRAM) TOPICAL DAILY
COMMUNITY
Start: 2024-02-23 | End: 2024-05-23

## 2024-03-07 NOTE — PROGRESS NOTES
Status post open mesh repair of large right inguinal hernia 11/22/2023     Subjective   No complaints     PE   Incisions healed, no evidence of recurrence    Impression/plan  Surgically stable   RTC p.r.n.

## 2025-06-24 ENCOUNTER — OFFICE VISIT (OUTPATIENT)
Dept: SURGERY | Facility: CLINIC | Age: 70
End: 2025-06-24
Attending: SPECIALIST
Payer: MEDICARE

## 2025-06-24 VITALS
SYSTOLIC BLOOD PRESSURE: 139 MMHG | HEART RATE: 89 BPM | OXYGEN SATURATION: 100 % | HEIGHT: 71 IN | WEIGHT: 127 LBS | DIASTOLIC BLOOD PRESSURE: 73 MMHG | BODY MASS INDEX: 17.78 KG/M2

## 2025-06-24 DIAGNOSIS — K40.90 LEFT INGUINAL HERNIA: Primary | ICD-10-CM

## 2025-06-24 PROCEDURE — 1160F RVW MEDS BY RX/DR IN RCRD: CPT | Mod: CPTII,S$GLB,, | Performed by: SPECIALIST

## 2025-06-24 PROCEDURE — 1159F MED LIST DOCD IN RCRD: CPT | Mod: CPTII,S$GLB,, | Performed by: SPECIALIST

## 2025-06-24 PROCEDURE — 3008F BODY MASS INDEX DOCD: CPT | Mod: CPTII,S$GLB,, | Performed by: SPECIALIST

## 2025-06-24 PROCEDURE — 3075F SYST BP GE 130 - 139MM HG: CPT | Mod: CPTII,S$GLB,, | Performed by: SPECIALIST

## 2025-06-24 PROCEDURE — 99999 PR PBB SHADOW E&M-EST. PATIENT-LVL V: CPT | Mod: PBBFAC,,, | Performed by: SPECIALIST

## 2025-06-24 PROCEDURE — 3288F FALL RISK ASSESSMENT DOCD: CPT | Mod: CPTII,S$GLB,, | Performed by: SPECIALIST

## 2025-06-24 PROCEDURE — 3078F DIAST BP <80 MM HG: CPT | Mod: CPTII,S$GLB,, | Performed by: SPECIALIST

## 2025-06-24 PROCEDURE — 99213 OFFICE O/P EST LOW 20 MIN: CPT | Mod: S$GLB,,, | Performed by: SPECIALIST

## 2025-06-24 PROCEDURE — 1101F PT FALLS ASSESS-DOCD LE1/YR: CPT | Mod: CPTII,S$GLB,, | Performed by: SPECIALIST

## 2025-06-24 PROCEDURE — 1125F AMNT PAIN NOTED PAIN PRSNT: CPT | Mod: CPTII,S$GLB,, | Performed by: SPECIALIST

## 2025-06-24 NOTE — PROGRESS NOTES
History & Physical     SUBJECTIVE:      History of Present Illness:  69-year-old male with large left inguinal hernia for repair.  Previous right inguinal hernia repair.  Past medical history of hypertension.  Ischemic cardiomyopathy.         Chief Complaint   Patient presents with    Hernia         Review of patient's allergies indicates:  No Known Allergies     Current Medications          Current Outpatient Medications   Medication Sig Dispense Refill    amLODIPine (NORVASC) 5 MG tablet Take 5 mg by mouth.        cyclobenzaprine (FLEXERIL) 10 MG tablet Take 1 tablet by mouth every evening.        diclofenac (VOLTAREN) 75 MG EC tablet Take 75 mg by mouth 2 (two) times daily as needed.        ibuprofen (ADVIL,MOTRIN) 600 MG tablet Take 1 tablet (600 mg total) by mouth every 6 (six) hours as needed for Pain. 20 tablet 0      No current facility-administered medications for this visit.                 Past Medical History:   Diagnosis Date    Pneumonia              Past Surgical History:   Procedure Laterality Date    KNEE ARTHROSCOPY          History reviewed. No pertinent family history.  Social History   Social History            Tobacco Use    Smoking status: Every Day       Current packs/day: 2.00       Types: Cigarettes    Smokeless tobacco: Never   Substance Use Topics    Alcohol use: Yes       Comment: Socially    Drug use: No            Review of Systems:  Review of Systems   Constitutional: Negative.  Negative for fatigue and fever.   HENT: Negative.  Negative for nosebleeds, sore throat and trouble swallowing.    Eyes: Negative.    Respiratory: Negative.     Cardiovascular: Negative.  Negative for chest pain.   Gastrointestinal: Negative.         Positive left groin pain.  Positive left groin mass   Genitourinary: Negative.    Musculoskeletal:  Positive for back pain.   Skin: Negative.    Neurological: Negative.    Psychiatric/Behavioral: Negative.        OBJECTIVE:      Vital Signs (Most  "Recent)  Pulse: 65 (11/13/23 1042)  BP: (!) 160/81 (11/13/23 1042)  SpO2: 100 % (11/13/23 1042)  5' 11" (1.803 m)  64.4 kg (142 lb)      Physical Exam:  Physical Exam  Constitutional:       Appearance: He is well-developed.   HENT:      Head: Normocephalic and atraumatic.      Right Ear: External ear normal.      Left Ear: External ear normal.   Eyes:      Pupils: Pupils are equal, round, and reactive to light.   Cardiovascular:      Rate and Rhythm: Normal rate and regular rhythm.      Heart sounds: Normal heart sounds.   Pulmonary:      Breath sounds: Normal breath sounds.   Abdominal:      General: Bowel sounds are normal.      Palpations: Abdomen is soft.      Comments: Large non reducible left inguinal hernia extending into scrotum   Musculoskeletal:         General: Normal range of motion.      Cervical back: Neck supple.   Skin:     General: Skin is warm and dry.   Neurological:      Mental Status: He is alert and oriented to person, place, and time.         ASSESSMENT/PLAN:      Large symptomatic right inguinal hernia for repair.  Consents reviewed in detail with patient and signed.        Electronically signed by Endy Duran Jr., MD at 11/13/2023 12:52 PM  "

## 2025-07-03 ENCOUNTER — ANESTHESIA EVENT (OUTPATIENT)
Dept: SURGERY | Facility: OTHER | Age: 70
End: 2025-07-03
Payer: MEDICARE

## 2025-07-07 ENCOUNTER — HOSPITAL ENCOUNTER (OUTPATIENT)
Dept: PREADMISSION TESTING | Facility: OTHER | Age: 70
Discharge: HOME | End: 2025-07-07
Attending: SPECIALIST
Payer: MEDICARE

## 2025-07-07 VITALS
DIASTOLIC BLOOD PRESSURE: 78 MMHG | HEIGHT: 71 IN | SYSTOLIC BLOOD PRESSURE: 132 MMHG | WEIGHT: 130 LBS | OXYGEN SATURATION: 100 % | BODY MASS INDEX: 18.2 KG/M2 | RESPIRATION RATE: 16 BRPM | HEART RATE: 81 BPM | TEMPERATURE: 98 F

## 2025-07-07 DIAGNOSIS — Z01.818 PREOP TESTING: Primary | ICD-10-CM

## 2025-07-07 LAB
ABSOLUTE EOSINOPHIL (OHS): 0.09 K/UL
ABSOLUTE MONOCYTE (OHS): 0.66 K/UL (ref 0.3–1)
ABSOLUTE NEUTROPHIL COUNT (OHS): 2.26 K/UL (ref 1.8–7.7)
ANION GAP (OHS): 12 MMOL/L (ref 8–16)
BASOPHILS # BLD AUTO: 0.04 K/UL
BASOPHILS NFR BLD AUTO: 0.7 %
BUN SERPL-MCNC: 18 MG/DL (ref 8–23)
CALCIUM SERPL-MCNC: 9.7 MG/DL (ref 8.7–10.5)
CHLORIDE SERPL-SCNC: 106 MMOL/L (ref 95–110)
CO2 SERPL-SCNC: 21 MMOL/L (ref 23–29)
CREAT SERPL-MCNC: 1.2 MG/DL (ref 0.5–1.4)
ERYTHROCYTE [DISTWIDTH] IN BLOOD BY AUTOMATED COUNT: 13.4 % (ref 11.5–14.5)
GFR SERPLBLD CREATININE-BSD FMLA CKD-EPI: >60 ML/MIN/1.73/M2
GLUCOSE SERPL-MCNC: 106 MG/DL (ref 70–110)
HCT VFR BLD AUTO: 36.2 % (ref 40–54)
HGB BLD-MCNC: 11.7 GM/DL (ref 14–18)
IMM GRANULOCYTES # BLD AUTO: 0.01 K/UL (ref 0–0.04)
IMM GRANULOCYTES NFR BLD AUTO: 0.2 % (ref 0–0.5)
LYMPHOCYTES # BLD AUTO: 2.9 K/UL (ref 1–4.8)
MCH RBC QN AUTO: 29.2 PG (ref 27–31)
MCHC RBC AUTO-ENTMCNC: 32.3 G/DL (ref 32–36)
MCV RBC AUTO: 90 FL (ref 82–98)
NUCLEATED RBC (/100WBC) (OHS): 0 /100 WBC
PLATELET # BLD AUTO: 256 K/UL (ref 150–450)
PMV BLD AUTO: 8.7 FL (ref 9.2–12.9)
POTASSIUM SERPL-SCNC: 4.3 MMOL/L (ref 3.5–5.1)
RBC # BLD AUTO: 4.01 M/UL (ref 4.6–6.2)
RELATIVE EOSINOPHIL (OHS): 1.5 %
RELATIVE LYMPHOCYTE (OHS): 48.7 % (ref 18–48)
RELATIVE MONOCYTE (OHS): 11.1 % (ref 4–15)
RELATIVE NEUTROPHIL (OHS): 37.8 % (ref 38–73)
SODIUM SERPL-SCNC: 139 MMOL/L (ref 136–145)
WBC # BLD AUTO: 5.96 K/UL (ref 3.9–12.7)

## 2025-07-07 PROCEDURE — 80048 BASIC METABOLIC PNL TOTAL CA: CPT

## 2025-07-07 PROCEDURE — 85025 COMPLETE CBC W/AUTO DIFF WBC: CPT

## 2025-07-07 RX ORDER — GABAPENTIN 300 MG/1
300 CAPSULE ORAL NIGHTLY
COMMUNITY

## 2025-07-07 RX ORDER — FERROUS SULFATE 325(65) MG
325 TABLET, DELAYED RELEASE (ENTERIC COATED) ORAL DAILY
COMMUNITY

## 2025-07-07 RX ORDER — SODIUM CHLORIDE, SODIUM LACTATE, POTASSIUM CHLORIDE, CALCIUM CHLORIDE 600; 310; 30; 20 MG/100ML; MG/100ML; MG/100ML; MG/100ML
INJECTION, SOLUTION INTRAVENOUS CONTINUOUS
Status: CANCELLED | OUTPATIENT
Start: 2025-07-07

## 2025-07-07 RX ORDER — TICAGRELOR 90 MG/1
90 TABLET, FILM COATED ORAL 2 TIMES DAILY
COMMUNITY
Start: 2025-04-07

## 2025-07-07 RX ORDER — METOPROLOL SUCCINATE 25 MG/1
25 TABLET, EXTENDED RELEASE ORAL DAILY
COMMUNITY
Start: 2025-06-30 | End: 2026-06-30

## 2025-07-07 RX ORDER — ATORVASTATIN CALCIUM 80 MG/1
80 TABLET, FILM COATED ORAL NIGHTLY
COMMUNITY
Start: 2025-04-07 | End: 2025-07-07 | Stop reason: CLARIF

## 2025-07-07 RX ORDER — LIDOCAINE HYDROCHLORIDE 10 MG/ML
0.5 INJECTION, SOLUTION EPIDURAL; INFILTRATION; INTRACAUDAL; PERINEURAL ONCE
Status: CANCELLED | OUTPATIENT
Start: 2025-07-07 | End: 2025-07-07

## 2025-07-07 RX ORDER — ALBUTEROL SULFATE 2.5 MG/.5ML
2.5 SOLUTION RESPIRATORY (INHALATION)
Status: CANCELLED | OUTPATIENT
Start: 2025-07-07 | End: 2025-07-07

## 2025-07-07 NOTE — DISCHARGE INSTRUCTIONS
Information to Prepare you for your Surgery    PRE-ADMIT TESTING -  [phone]   -Griselda  [address] [place] [address] [address]  [facility]      Your surgery has been scheduled at [facility]. We are pleased to have the opportunity to serve you. For Further Information please call [phone].    On the day of surgery please report to the Information Desk on the 1st floor.    CONTACT YOUR PHYSICIAN'S OFFICE THE DAY PRIOR TO YOUR SURGERY TO OBTAIN YOUR ARRIVAL TIME.     The evening before surgery do not eat anything after 9 p.m. ( this includes hard candy, chewing gum and mints). You may only have GATORADE, POWERADE AND WATER  from 9 p.m. until you leave your home.    AT LEAST 12 OUNCES BEFORE YOU LEAVE YOU HOUSE IN THE MORNING TO COME TO SURGERY.    DO NOT DRINK ANY LIQUIDS ON THE WAY TO THE HOSPITAL.      Why does your anesthesiologist allow you to drink Gatorade/Powerade before surgery?  Gatorade/Powerade helps to increase your comfort before surgery and to decrease your nausea after surgery. The carbohydrates in Gatorade/Powerade help reduce your body's stress response to surgery.    Outpatient Surgery- May allow 2 adult (18 and older) Support Persons (1 being the designated ) for all surgical/procedural patients. A breastfeeding mother will be allowed her infant and 2 adult Support Persons. No one under the age of 18 will be allowed in the building. No swapping out of visitors in the [facility].      SPECIAL MEDICATION INSTRUCTIONS: TAKE medications checked off  on your Medication List.    Please bring blood pressure medications  the day of surgery.         Surgery Patients:    If you take ASPIRIN - Your PHYSICIAN/SURGEON will need to inform you IF/OR when you need to stop taking aspirin prior to your surgery.     The week prior to surgery do not ot take any medications containing IBUPROFEN or NSAIDS ( Advil,  Motrin, Goodys, BC, Aleve, Naproxen,  Ketorolac, Meloxicam, Mobic, Toradol,etc) If you are not sure if you should take a medicine please call your surgeon's office.  Ok to take Tylenol    Do Not Wear any make-up (especially eye make-up) to surgery. Please remove any false eyelashes or eyelash extensions. If you arrive the day of surgery with makeup/eyelashes on you will be required to remove prior to surgery. (There is a risk of corneal abrasions if eye makeup/eyelash extensions are not removed)      Leave all valuables at home.   Do Not wear any jewelry or watches, including any metal in body piercings. Jewelry must be removed prior to coming to the hospital.  There is a possibility that rings that are unable to be removed may be cut off if they are on the surgical extremity.    Please remove all hair extensions, wigs, clips and any other metal accessories/ ornaments from your hair.  These items may pose a flammable/fire risk in Surgery and must be removed.    Do not shave your surgical area at least 5 days prior to your surgery. The surgical prep will be performed at the hospital according to Infection Control regulations.    Contact Lens must be removed before surgery. Either do not wear the contact lens or bring a case and solution for storage.  Please bring a container for eyeglasses or dentures as required.  Bring any paperwork your physician has provided, such as consent forms,  history and physicals, doctor's orders, etc.   Bring comfortable clothes that are loose fitting to wear upon discharge. Take into consideration the type of surgery being performed.  Maintain your diet as advised per your physician the day prior to surgery.      Adequate rest the night before surgery is advised.   Park in the Parking lot behind the hospital or in the [email or web address] [facility] across the street from the parking lot. Parking is complimentary.  If you will be discharged the same day as your procedure, please arrange for a  responsible adult to drive you home or to accompany you if traveling by taxi.   YOU WILL NOT BE PERMITTED TO DRIVE OR TO LEAVE THE HOSPITAL ALONE AFTER SURGERY.   If you are being discharged the same day, it is strongly recommended that you arrange for someone to remain with you for the first 24 hrs following your surgery.    The Surgeon will speak to your family/visitor after your surgery regarding the outcome of your surgery and post op care.  The Surgeon may speak to you after your surgery, but there is a possibility you may not remember the details.  Please check with your family members regarding the conversation with the Surgeon.    We strongly recommend whoever is bringing you home be present for discharge instructions.  This will ensure a thorough understanding for your post op home care.    If the patient has fever, cough, or signs/symptoms of Flu or Covid please do not come in for your surgery. Contact your surgeon and your primary care physician for further instructions.           Thank you for your cooperation.  The Staff of [facility].            Bathing Instructions with Hibiclens or Dial Soap    Shower the evening before and morning of your procedure with Chlorhexidine (Hibiclens)  do not use Chlorhexidine on your face or genitals. Do not get in your eyes.  Wash your face with water and your regular face wash/soap  Use your regular shampoo  Apply Chlorhexidine (Hibiclens) directly on your skin or on a wet washcloth and wash gently. When showering: Move away from the shower stream when applying Chlorhexidine (Hibiclens) to avoid rinsing off too soon.  Rinse thoroughly with warm water  Do not dilute Chlorhexidine (Hibiclens)   Dry off as usual, do not use any deodorant, powder, body lotions, perfume, after shave or cologne.   Place clean sheets on bed day before surgery.    Thanks Griselda

## 2025-07-07 NOTE — ANESTHESIA PREPROCEDURE EVALUATION
"                                                                                                             07/06/2025  Linden Spencer is a 69 y.o., male.      Pre-op Assessment    I have reviewed the Patient Summary Reports.     I have reviewed the Nursing Notes. I have reviewed the NPO Status.   I have reviewed the Medications.     Review of Systems  Anesthesia Hx:             Denies Family Hx of Anesthesia complications.    Denies Personal Hx of Anesthesia complications.                    Social:  Smoker       Hematology/Oncology:    Oncology Normal    -- Anemia:               Hematology Comments:                       EENT/Dental:  EENT/Dental Normal           Cardiovascular:     Hypertension  Past MI (02/2024)    CABG/stent (Presence of drug-eluting stent in left circumflex coronary artery) Dysrhythmias (bradycardia noted in hx, HR 81 at PAT appointment)   Denies Angina. CHF        Ischemic cardiomyopathy    ECHO 05/2025:  Summary:    1. Estimated left ventricular ejection fraction is 25 to 30%.    2. Left atrium is mildly dilated.    3. There is small pericardial effusion.    4. Mild aortic regurgitation.    5. LV diastolic function is mildly abnormal (Grade I).    6. Mildly dilated left ventricle.    7. Dilated aortic root.    8. Moderate mitral valve regurgitation.    9. Right ventricular systolic function is normal.     "2/21/24 [facility] post NSTEMI with Dr Jolly. LAUREN stent to circumflex.LAD 80-90% stenosis in proximal LAD; 80% stenosis OM1; 60% stenosis RCA. EF 35-40%"                             Pulmonary:  Pulmonary Normal                       Renal/:  Renal/ Normal                 Hepatic/GI:  Hepatic/GI Normal       Large right inguinal hernia  " Large non reducible left inguinal hernia extending into scrotum"             Musculoskeletal:  Musculoskeletal Normal                Neurological:  Neurology Normal                                      Endocrine:  Endocrine Normal          "   Dermatological:  Skin Normal    Psych:  Psychiatric Normal                    Physical Exam  General: Well nourished, Cooperative, Alert and Oriented    Airway:  Mallampati: III   Mouth Opening: Normal  TM Distance: Normal  Tongue: Normal  Neck ROM: Normal ROM    Dental:  Intact  Poor dentition, multiple chipped and loose teeth      Anesthesia Plan  Type of Anesthesia, risks & benefits discussed:    Anesthesia Type: Regional  Intra-op Monitoring Plan: Standard ASA Monitors  Post Op Pain Control Plan: multimodal analgesia  Induction:  IV  Informed Consent: Informed consent signed with the Patient and all parties understand the risks and agree with anesthesia plan.  All questions answered.   ASA Score: 3  Anesthesia Plan Notes: CBC, BMP  Cardiology note 06/2025 noted that he has Referral for ICD. has EF in 05/2025 was 25 to 30%, needs cardiology clearance.  Has not started his metoprolol or entresto, is also not taking his isosorbide, or brilinta. His cardiologist was notified.     Update: cardiology eval/clearance in paper chart: moderate risk    Ready For Surgery From Anesthesia Perspective.     .

## 2025-07-08 NOTE — PRE ADMISSION SCREENING
ERICH Ivey FNP reviewed  cardiac risk, no new orders. Dr Duran notified of moderate cardiac risk and scanned into media for review.

## 2025-07-10 ENCOUNTER — HOSPITAL ENCOUNTER (OUTPATIENT)
Facility: OTHER | Age: 70
Discharge: HOME | End: 2025-07-10
Attending: SPECIALIST | Admitting: SPECIALIST
Payer: MEDICARE

## 2025-07-10 ENCOUNTER — ANESTHESIA (OUTPATIENT)
Dept: SURGERY | Facility: OTHER | Age: 70
End: 2025-07-10
Payer: MEDICARE

## 2025-07-10 DIAGNOSIS — K40.90 LEFT INGUINAL HERNIA: ICD-10-CM

## 2025-07-10 PROCEDURE — 63600175 PHARM REV CODE 636 W HCPCS: Performed by: STUDENT IN AN ORGANIZED HEALTH CARE EDUCATION/TRAINING PROGRAM

## 2025-07-10 PROCEDURE — 25000003 PHARM REV CODE 250: Performed by: SPECIALIST

## 2025-07-10 PROCEDURE — 36000707: Performed by: SPECIALIST

## 2025-07-10 PROCEDURE — 94640 AIRWAY INHALATION TREATMENT: CPT

## 2025-07-10 PROCEDURE — 76942 ECHO GUIDE FOR BIOPSY: CPT | Performed by: ANESTHESIOLOGY

## 2025-07-10 PROCEDURE — 49505 PRP I/HERN INIT REDUC >5 YR: CPT | Mod: LT,,, | Performed by: SPECIALIST

## 2025-07-10 PROCEDURE — 25000003 PHARM REV CODE 250: Performed by: ANESTHESIOLOGY

## 2025-07-10 PROCEDURE — 63600175 PHARM REV CODE 636 W HCPCS: Performed by: SPECIALIST

## 2025-07-10 PROCEDURE — 37000008 HC ANESTHESIA 1ST 15 MINUTES: Performed by: SPECIALIST

## 2025-07-10 PROCEDURE — 63600175 PHARM REV CODE 636 W HCPCS: Performed by: ANESTHESIOLOGY

## 2025-07-10 PROCEDURE — 36000706: Performed by: SPECIALIST

## 2025-07-10 PROCEDURE — 55040 REMOVAL OF HYDROCELE: CPT | Mod: 51,LT,, | Performed by: SPECIALIST

## 2025-07-10 PROCEDURE — 71000033 HC RECOVERY, INTIAL HOUR: Performed by: SPECIALIST

## 2025-07-10 PROCEDURE — 88302 TISSUE EXAM BY PATHOLOGIST: CPT | Mod: TC | Performed by: SPECIALIST

## 2025-07-10 PROCEDURE — 71000039 HC RECOVERY, EACH ADD'L HOUR: Performed by: SPECIALIST

## 2025-07-10 PROCEDURE — 63600175 PHARM REV CODE 636 W HCPCS: Mod: JZ,TB | Performed by: SPECIALIST

## 2025-07-10 PROCEDURE — 94761 N-INVAS EAR/PLS OXIMETRY MLT: CPT

## 2025-07-10 PROCEDURE — 88302 TISSUE EXAM BY PATHOLOGIST: CPT | Mod: 26,,, | Performed by: STUDENT IN AN ORGANIZED HEALTH CARE EDUCATION/TRAINING PROGRAM

## 2025-07-10 PROCEDURE — 25000242 PHARM REV CODE 250 ALT 637 W/ HCPCS

## 2025-07-10 PROCEDURE — 71000016 HC POSTOP RECOV ADDL HR: Performed by: SPECIALIST

## 2025-07-10 PROCEDURE — C1781 MESH (IMPLANTABLE): HCPCS | Performed by: SPECIALIST

## 2025-07-10 PROCEDURE — 63600175 PHARM REV CODE 636 W HCPCS

## 2025-07-10 PROCEDURE — 71000015 HC POSTOP RECOV 1ST HR: Performed by: SPECIALIST

## 2025-07-10 PROCEDURE — 37000009 HC ANESTHESIA EA ADD 15 MINS: Performed by: SPECIALIST

## 2025-07-10 DEVICE — MESH SOFT PROLENE WVN 3X6: Type: IMPLANTABLE DEVICE | Site: GROIN | Status: FUNCTIONAL

## 2025-07-10 RX ORDER — GLUCAGON 1 MG
1 KIT INJECTION
Status: DISCONTINUED | OUTPATIENT
Start: 2025-07-10 | End: 2025-07-10 | Stop reason: HOSPADM

## 2025-07-10 RX ORDER — ACETAMINOPHEN 500 MG
1000 TABLET ORAL
Status: DISCONTINUED | OUTPATIENT
Start: 2025-07-10 | End: 2025-07-10 | Stop reason: HOSPADM

## 2025-07-10 RX ORDER — ALBUTEROL SULFATE 2.5 MG/.5ML
2.5 SOLUTION RESPIRATORY (INHALATION)
Status: COMPLETED | OUTPATIENT
Start: 2025-07-10 | End: 2025-07-10

## 2025-07-10 RX ORDER — LIDOCAINE HYDROCHLORIDE 20 MG/ML
INJECTION INTRAVENOUS
Status: DISCONTINUED | OUTPATIENT
Start: 2025-07-10 | End: 2025-07-10

## 2025-07-10 RX ORDER — HYDROMORPHONE HYDROCHLORIDE 2 MG/ML
0.4 INJECTION, SOLUTION INTRAMUSCULAR; INTRAVENOUS; SUBCUTANEOUS EVERY 5 MIN PRN
Status: DISCONTINUED | OUTPATIENT
Start: 2025-07-10 | End: 2025-07-10 | Stop reason: HOSPADM

## 2025-07-10 RX ORDER — LIDOCAINE HYDROCHLORIDE 10 MG/ML
0.5 INJECTION, SOLUTION EPIDURAL; INFILTRATION; INTRACAUDAL; PERINEURAL ONCE
Status: DISCONTINUED | OUTPATIENT
Start: 2025-07-10 | End: 2025-07-10 | Stop reason: HOSPADM

## 2025-07-10 RX ORDER — PROPOFOL 10 MG/ML
VIAL (ML) INTRAVENOUS
Status: DISCONTINUED | OUTPATIENT
Start: 2025-07-10 | End: 2025-07-10

## 2025-07-10 RX ORDER — ROPIVACAINE HYDROCHLORIDE 5 MG/ML
INJECTION, SOLUTION EPIDURAL; INFILTRATION; PERINEURAL
Status: COMPLETED | OUTPATIENT
Start: 2025-07-10 | End: 2025-07-10

## 2025-07-10 RX ORDER — SODIUM CHLORIDE, SODIUM LACTATE, POTASSIUM CHLORIDE, CALCIUM CHLORIDE 600; 310; 30; 20 MG/100ML; MG/100ML; MG/100ML; MG/100ML
INJECTION, SOLUTION INTRAVENOUS CONTINUOUS
Status: DISCONTINUED | OUTPATIENT
Start: 2025-07-10 | End: 2025-07-10 | Stop reason: HOSPADM

## 2025-07-10 RX ORDER — MEPERIDINE HYDROCHLORIDE 25 MG/ML
12.5 INJECTION INTRAMUSCULAR; INTRAVENOUS; SUBCUTANEOUS ONCE AS NEEDED
Status: DISCONTINUED | OUTPATIENT
Start: 2025-07-10 | End: 2025-07-10 | Stop reason: HOSPADM

## 2025-07-10 RX ORDER — SODIUM CHLORIDE 9 MG/ML
INJECTION, SOLUTION INTRAVENOUS CONTINUOUS
Status: DISCONTINUED | OUTPATIENT
Start: 2025-07-10 | End: 2025-07-10 | Stop reason: HOSPADM

## 2025-07-10 RX ORDER — BUPIVACAINE HCL/EPINEPHRINE 0.25-.0005
VIAL (ML) INJECTION
Status: DISCONTINUED | OUTPATIENT
Start: 2025-07-10 | End: 2025-07-10 | Stop reason: HOSPADM

## 2025-07-10 RX ORDER — CEFAZOLIN 2 G/1
2 INJECTION, POWDER, FOR SOLUTION INTRAMUSCULAR; INTRAVENOUS
Status: COMPLETED | OUTPATIENT
Start: 2025-07-10 | End: 2025-07-10

## 2025-07-10 RX ORDER — SODIUM CHLORIDE 0.9 % (FLUSH) 0.9 %
3 SYRINGE (ML) INJECTION
Status: DISCONTINUED | OUTPATIENT
Start: 2025-07-10 | End: 2025-07-10 | Stop reason: HOSPADM

## 2025-07-10 RX ORDER — MIDAZOLAM HYDROCHLORIDE 1 MG/ML
INJECTION INTRAMUSCULAR; INTRAVENOUS
Status: DISCONTINUED | OUTPATIENT
Start: 2025-07-10 | End: 2025-07-10

## 2025-07-10 RX ORDER — HYDROCODONE BITARTRATE AND ACETAMINOPHEN 7.5; 325 MG/1; MG/1
1 TABLET ORAL EVERY 6 HOURS PRN
Qty: 28 TABLET | Refills: 0 | Status: SHIPPED | OUTPATIENT
Start: 2025-07-10

## 2025-07-10 RX ORDER — OXYCODONE HYDROCHLORIDE 5 MG/1
5 TABLET ORAL
Status: DISCONTINUED | OUTPATIENT
Start: 2025-07-10 | End: 2025-07-10 | Stop reason: HOSPADM

## 2025-07-10 RX ORDER — LIDOCAINE HYDROCHLORIDE 10 MG/ML
1 INJECTION, SOLUTION EPIDURAL; INFILTRATION; INTRACAUDAL; PERINEURAL ONCE
Status: DISCONTINUED | OUTPATIENT
Start: 2025-07-10 | End: 2025-07-10 | Stop reason: HOSPADM

## 2025-07-10 RX ORDER — ONDANSETRON HYDROCHLORIDE 2 MG/ML
4 INJECTION, SOLUTION INTRAVENOUS DAILY PRN
Status: DISCONTINUED | OUTPATIENT
Start: 2025-07-10 | End: 2025-07-10 | Stop reason: HOSPADM

## 2025-07-10 RX ORDER — BUPIVACAINE 13.3 MG/ML
INJECTION, SUSPENSION, LIPOSOMAL INFILTRATION
Status: DISCONTINUED | OUTPATIENT
Start: 2025-07-10 | End: 2025-07-10 | Stop reason: HOSPADM

## 2025-07-10 RX ORDER — FENTANYL CITRATE 50 UG/ML
INJECTION, SOLUTION INTRAMUSCULAR; INTRAVENOUS
Status: DISCONTINUED | OUTPATIENT
Start: 2025-07-10 | End: 2025-07-10

## 2025-07-10 RX ORDER — CELECOXIB 200 MG/1
400 CAPSULE ORAL
Status: DISCONTINUED | OUTPATIENT
Start: 2025-07-10 | End: 2025-07-10 | Stop reason: HOSPADM

## 2025-07-10 RX ORDER — PROPOFOL 10 MG/ML
VIAL (ML) INTRAVENOUS CONTINUOUS PRN
Status: DISCONTINUED | OUTPATIENT
Start: 2025-07-10 | End: 2025-07-10

## 2025-07-10 RX ADMIN — PROPOFOL 50 MCG/KG/MIN: 10 INJECTION, EMULSION INTRAVENOUS at 08:07

## 2025-07-10 RX ADMIN — ALBUTEROL SULFATE 2.5 MG: 2.5 SOLUTION RESPIRATORY (INHALATION) at 06:07

## 2025-07-10 RX ADMIN — HYDROMORPHONE HYDROCHLORIDE 0.4 MG: 2 INJECTION INTRAMUSCULAR; INTRAVENOUS; SUBCUTANEOUS at 10:07

## 2025-07-10 RX ADMIN — PROPOFOL 30 MG: 10 INJECTION, EMULSION INTRAVENOUS at 08:07

## 2025-07-10 RX ADMIN — MIDAZOLAM HYDROCHLORIDE 2 MG: 1 INJECTION, SOLUTION INTRAMUSCULAR; INTRAVENOUS at 08:07

## 2025-07-10 RX ADMIN — ROPIVACAINE HYDROCHLORIDE 20 ML: 5 INJECTION, SOLUTION EPIDURAL; INFILTRATION; PERINEURAL at 08:07

## 2025-07-10 RX ADMIN — FENTANYL CITRATE 100 MCG: 50 INJECTION, SOLUTION INTRAMUSCULAR; INTRAVENOUS at 08:07

## 2025-07-10 RX ADMIN — OXYCODONE HYDROCHLORIDE 5 MG: 5 TABLET ORAL at 10:07

## 2025-07-10 RX ADMIN — CEFAZOLIN 2 G: 2 INJECTION, POWDER, FOR SOLUTION INTRAMUSCULAR; INTRAVENOUS at 08:07

## 2025-07-10 RX ADMIN — SODIUM CHLORIDE, SODIUM LACTATE, POTASSIUM CHLORIDE, AND CALCIUM CHLORIDE: .6; .31; .03; .02 INJECTION, SOLUTION INTRAVENOUS at 08:07

## 2025-07-10 RX ADMIN — LIDOCAINE HYDROCHLORIDE 60 MG: 20 INJECTION, SOLUTION INTRAVENOUS at 08:07

## 2025-07-10 NOTE — PLAN OF CARE
Linden Spencer has met all discharge criteria from Phase II. Vital Signs are stable, ambulating  without difficulty. Discharge instructions given, patient verbalized understanding. Discharged from facility via wheelchair in stable condition.

## 2025-07-10 NOTE — OP NOTE
[facility] ([facility]  Operative Note    SUMMARY     Surgery Date: 7/10/2025     Surgeons and Role:     * Endy Duran Jr., MD - Primary     * Bhaskar Delacruz MD - Resident - Assisting        Pre-op Diagnosis:  Left inguinal hernia [K40.90]    Post-op Diagnosis:  Post-Op Diagnosis Codes:     * Left inguinal hernia [K40.90]    Procedure(s) (LRB):  REPAIR, INGUINAL HERNIA, LEFT (Left)    Anesthesia: Regional    Description of Procedure:  The patient was brought to the operating and placed in supine position.  The abdomen and groin prepped and draped in a sterile fashion.  A transverse incision made over the left inguinal canal.  Using sharp dissection the incision was carried down through the subcutaneous tissues of the fascia of the external oblique opened along the bias of the fibers.  Patient had a direct hernia present.  There was also a large hydrocele.  Using blunt dissection the testicle and hydrocele were removed from the scrotum.  Using blunt sharp dissection the hydrocele was removed from the testicle and spermatic cord.  Hemostasis obtained with electrocautery Bovie and 3-0 Vicryl ties.  After removal of the hydrocele the floor of the inguinal canal was palpated and there was a direct hernia present.  There was no evidence of femoral hernia.  The floor of the inguinal canal was then reconstructed with soft Prolene mesh sutured along the ileopubic tract laterally and the conjoined tendon medially.  The mesh was key holed around the spermatic cord and secured with interrupted 0 Ethibond sutures.  After reconstruction of the floor of the inguinal canal the wounds were inspected.  The spermatic cord returned to its normal anatomic position.  The external oblique and Berkley's fascia closed with running 3-0 Vicryl.  The skin closed with running 4-0 Monocryl and the wounds sterilely dressed.  The patient tolerated the procedure well left the operating room good condition.  At the end the procedure all  sponge, lap and instrument counts were correct.    Estimated Blood Loss:  Minimal         Specimens:   Specimen (24h ago, onward)       Start     Ordered    07/10/25 0915  Specimen to Pathology General Surgery  RELEASE UPON ORDERING        References:    Click here for ordering Quick Tip   Question:  Release to patient  Answer:  Immediate    07/10/25 0915                        SUMMARY     Admit Date:     Discharge Date and Time: 07/10/2025 10:50 AM    Hospital Course (synopsis of major diagnoses, care, treatment, and services provided during the course of the hospital stay):  Benign     Final Diagnosis: Post-Op Diagnosis Codes:     * Left inguinal hernia [K40.90]    Disposition: Home or Self Care    Follow Up/Patient Instructions:     Medications:  Reconciled Home Medications:      Medication List        START taking these medications      HYDROcodone-acetaminophen 7.5-325 mg per tablet  Commonly known as: NORCO  Take 1 tablet by mouth every 6 (six) hours as needed for Pain.            CONTINUE taking these medications      aspirin 81 MG EC tablet  Commonly known as: ECOTRIN  Take 1 tablet by mouth once daily.     atorvastatin 80 MG tablet  Commonly known as: LIPITOR  Take 1 tablet by mouth every evening.     ferrous sulfate 325 (65 FE) MG EC tablet  Take 325 mg by mouth once daily.     gabapentin 300 MG capsule  Commonly known as: NEURONTIN  Take 300 mg by mouth every evening.     isosorbide dinitrate 5 MG Tab  Commonly known as: ISORDIL  Take 1 tablet by mouth 3 (three) times daily.     metoprolol succinate 25 MG 24 hr tablet  Commonly known as: TOPROL-XL  Take 25 mg by mouth once daily.     nitroGLYCERIN 0.4 MG SL tablet  Commonly known as: NITROSTAT  Place 0.4 mg under the tongue.     sacubitriL-valsartan 24-26 mg per tablet  Commonly known as: ENTRESTO  Take 1 tablet by mouth 2 (two) times daily.     ticagrelor 90 mg tablet  Commonly known as: BRILINTA  Take 90 mg by mouth 2 (two) times daily.             Discharge Procedure Orders   Diet general     Call MD for:  temperature >100.4     Call MD for:  persistent nausea and vomiting     Call MD for:  severe uncontrolled pain     Call MD for:  difficulty breathing, headache or visual disturbances     Call MD for:  redness, tenderness, or signs of infection (pain, swelling, redness, odor or green/yellow discharge around incision site)     Lifting restrictions     Ice to affected area     Wound care routine (specify)   Order Comments: Wound care routine:  Leave Prineo intact  May shower   Ice packs      Follow-up Information       Endy Duran Jr., MD Follow up in 10 day(s).    Specialties: General Surgery, Vascular Surgery  Contact information:  [address] [id number] [phone]

## 2025-07-10 NOTE — ANESTHESIA PROCEDURE NOTES
Peripheral Block    Patient location during procedure: holding area    Reason for block: primary anesthetic    Diagnosis: left inguinal hernia   Timeout: 7/10/2025 8:11 AM   End time: 7/10/2025 8:21 AM    Staffing  Authorizing Provider: Navi Mantilla MD  Performing Provider: Navi Mantilla MD    Staffing  Performed by: Navi Mantilla MD  Authorized by: Navi Mantilla MD    Preanesthetic Checklist  Completed: patient identified, IV checked, site marked, risks and benefits discussed, surgical consent, monitors and equipment checked, pre-op evaluation and timeout performed  Peripheral Block  Patient position: supine  Prep: ChloraPrep  Patient monitoring: heart rate, cardiac monitor, continuous pulse ox and frequent blood pressure checks  Block type: TAP  Laterality: left  Injection technique: single shot  Needle  Needle gauge: 20 G  Needle length: 4 in  Needle localization: ultrasound guidance   -ultrasound image captured on disc.  Assessment  Injection assessment: negative aspiration, negative parasthesia and local visualized surrounding nerve  Paresthesia pain: none  Heart rate change: no  Slow fractionated injection: yes  Pain Tolerance: comfortable throughout block and no complaints  Medications:    Medications: ropivacaine (NAROPIN) injection 0.5% - Perineural   20 mL - 7/10/2025 8:21:00 AM

## 2025-07-10 NOTE — ANESTHESIA PROCEDURE NOTES
Peripheral Block    Patient location during procedure: holding area    Reason for block: primary anesthetic    Diagnosis: Left inguinal hernia   Timeout: 7/10/2025 8:11 AM   End time: 7/10/2025 8:21 AM    Staffing  Authorizing Provider: Navi Mantilla MD  Performing Provider: Navi Mantilla MD    Staffing  Performed by: Navi Mantilla MD  Authorized by: Navi Mantilla MD    Preanesthetic Checklist  Completed: patient identified, IV checked, site marked, risks and benefits discussed, surgical consent, monitors and equipment checked, pre-op evaluation and timeout performed  Peripheral Block  Patient position: supine  Prep: ChloraPrep  Patient monitoring: heart rate, cardiac monitor, continuous pulse ox and frequent blood pressure checks  Block type: ilioinguinal/iliohypogastric  Laterality: left  Injection technique: single shot  Needle  Needle gauge: 20 G  Needle length: 4 in  Needle localization: ultrasound guidance   -ultrasound image captured on disc.  Assessment  Injection assessment: negative aspiration, negative parasthesia and local visualized surrounding nerve  Paresthesia pain: none  Heart rate change: no  Slow fractionated injection: yes  Pain Tolerance: comfortable throughout block and no complaints  Medications:    Medications: ropivacaine (NAROPIN) injection 0.5% - Perineural   20 mL - 7/10/2025 8:21:00 AM

## 2025-07-10 NOTE — DISCHARGE INSTRUCTIONS
Your Surgeon Gave You EXPAREL® (bupivacaine liposome injectable suspension)    To help control your pain after surgery, your surgeon injected EXPAREL into your surgical incision just before the end of the procedure.   EXPAREL is a local analgesic that contains the local anesthetic bupivacaine. Local anesthetics provide pain relief by numbing the tissue  around the surgical site.   EXPAREL is specifically designed to release pain medication over time and can control pain for up to 72 hours.   In addition to EXPAREL, your surgeon may provide other pain medications to control your pain.   Each patient is different and responds differently to painmedication. Depending on how you respond to EXPAREL, you may require less  additional pain medication during your recovery.    Possible Side Effects    Side effects can occur with any medication and it is important not to ignore anything you may be experiencing. Some patients who  received EXPAREL experienced nausea, vomiting, or constipation. Rarely, patients who receive bupivacaine (the active ingredient in  EXPAREL) have experienced numbness and tingling in their mouth or lips, lightheadedness, or anxiety. Speak with your doctor right  away if you think you may be experiencing any of these sensations, or if you have other questions regarding possible side effects.    Your Recovery    When your pain is under control, your body can better focus on healing. This is not the time to test your pain tolerance, or grin and  bear it.Work with your surgeon and nurse to make your recovery as speedy and pain-free as possible.   Follow the post-op orders your nurse gave you.   Eat a healthy diet and drink plenty of water. Surgery stresses your body; your body responds by needing more energy to heal.      Important Information About EXPAREL  Products that contain bupivacaine, like EXPAREL, may cause a temporary loss of  sensation or the ability to move in the area where bupivacaine  was injected.    Date Administered: 7/10/25  Time Administered: 09:52am    Other Forms of Bupivicaine should not be administered within 96hrs following administration of EXPAREL.

## 2025-07-10 NOTE — ANESTHESIA POSTPROCEDURE EVALUATION
Anesthesia Post Evaluation    Patient: Linden Spencer    Procedure(s) Performed: Procedure(s) (LRB):  REPAIR, INGUINAL HERNIA, LEFT (Left)    Final Anesthesia Type: regional      Patient location during evaluation: PACU  Patient participation: Yes- Able to Participate  Level of consciousness: awake and alert  Post-procedure vital signs: reviewed and stable  Pain management: adequate  Airway patency: patent    PONV status at discharge: No PONV  Anesthetic complications: no      Cardiovascular status: blood pressure returned to baseline  Respiratory status: unassisted  Hydration status: euvolemic  Follow-up not needed.              Vitals Value Taken Time   /66 07/10/25 11:35   Temp 36.8 °C (98.2 °F) 07/10/25 10:08   Pulse 60 07/10/25 11:35   Resp 16 07/10/25 11:35   SpO2 98 % 07/10/25 11:35         Event Time   Out of Recovery 11:02:00         Pain/Geetha Score: Pain Rating Prior to Med Admin: 8 (7/10/2025 10:39 AM)  Geetha Score: 10 (7/10/2025 11:35 AM)

## 2025-07-10 NOTE — TRANSFER OF CARE
Anesthesia Transfer of Care Note    Patient: Linden Spencer    Procedure(s) Performed: Procedure(s) (LRB):  REPAIR, INGUINAL HERNIA, LEFT (Left)    Patient location: PACU    Anesthesia Type: regional    Transport from OR: Transported from OR on room air with adequate spontaneous ventilation    Post pain: adequate analgesia    Post assessment: no apparent anesthetic complications and tolerated procedure well    Post vital signs: stable    Level of consciousness: awake and responds to stimulation    Nausea/Vomiting: no nausea/vomiting    Complications: none    Transfer of care protocol was followed      Last vitals: Visit Vitals  /61 (BP Location: Left arm, Patient Position: Lying)   Pulse 61   Temp 36.4 °C (97.6 °F) (Oral)   Resp 16   SpO2 100%

## 2025-07-11 VITALS
DIASTOLIC BLOOD PRESSURE: 70 MMHG | HEART RATE: 53 BPM | OXYGEN SATURATION: 93 % | SYSTOLIC BLOOD PRESSURE: 150 MMHG | RESPIRATION RATE: 16 BRPM | TEMPERATURE: 98 F

## 2025-07-11 LAB
ESTROGEN SERPL-MCNC: NORMAL PG/ML
INSULIN SERPL-ACNC: NORMAL U[IU]/ML
LAB AP CLINICAL INFORMATION: NORMAL
LAB AP GROSS DESCRIPTION: NORMAL
LAB AP PERFORMING LOCATION(S): NORMAL
LAB AP REPORT FOOTNOTES: NORMAL

## 2025-07-21 ENCOUNTER — OFFICE VISIT (OUTPATIENT)
Dept: SURGERY | Facility: CLINIC | Age: 70
End: 2025-07-21
Attending: SPECIALIST
Payer: MEDICARE

## 2025-07-21 VITALS — DIASTOLIC BLOOD PRESSURE: 55 MMHG | OXYGEN SATURATION: 99 % | SYSTOLIC BLOOD PRESSURE: 97 MMHG | HEART RATE: 88 BPM

## 2025-07-21 DIAGNOSIS — K40.90 LEFT INGUINAL HERNIA: Primary | ICD-10-CM

## 2025-07-21 PROCEDURE — 1159F MED LIST DOCD IN RCRD: CPT | Mod: CPTII,S$GLB,, | Performed by: SPECIALIST

## 2025-07-21 PROCEDURE — 1126F AMNT PAIN NOTED NONE PRSNT: CPT | Mod: CPTII,S$GLB,, | Performed by: SPECIALIST

## 2025-07-21 PROCEDURE — 99024 POSTOP FOLLOW-UP VISIT: CPT | Mod: S$GLB,,, | Performed by: SPECIALIST

## 2025-07-21 PROCEDURE — 3074F SYST BP LT 130 MM HG: CPT | Mod: CPTII,S$GLB,, | Performed by: SPECIALIST

## 2025-07-21 PROCEDURE — 1160F RVW MEDS BY RX/DR IN RCRD: CPT | Mod: CPTII,S$GLB,, | Performed by: SPECIALIST

## 2025-07-21 PROCEDURE — 3078F DIAST BP <80 MM HG: CPT | Mod: CPTII,S$GLB,, | Performed by: SPECIALIST

## 2025-07-21 PROCEDURE — 1101F PT FALLS ASSESS-DOCD LE1/YR: CPT | Mod: CPTII,S$GLB,, | Performed by: SPECIALIST

## 2025-07-21 PROCEDURE — 4010F ACE/ARB THERAPY RXD/TAKEN: CPT | Mod: CPTII,S$GLB,, | Performed by: SPECIALIST

## 2025-07-21 PROCEDURE — 3288F FALL RISK ASSESSMENT DOCD: CPT | Mod: CPTII,S$GLB,, | Performed by: SPECIALIST

## 2025-07-21 PROCEDURE — 99999 PR PBB SHADOW E&M-EST. PATIENT-LVL III: CPT | Mod: PBBFAC,,, | Performed by: SPECIALIST

## 2025-07-21 NOTE — PROGRESS NOTES
69-year-old male status post open mesh repair of left inguinal hernia and resection of the large hydrocele.    Subjective   No complaints     PE   Incisions healing without evidence of infection or recurrence   Expected scrotal and dependent edema     Impression/plan   Surgically stable   RTC 1 month

## 2025-07-26 ENCOUNTER — HOSPITAL ENCOUNTER (EMERGENCY)
Facility: OTHER | Age: 70
Discharge: HOME | End: 2025-07-26
Attending: EMERGENCY MEDICINE
Payer: MEDICARE

## 2025-07-26 VITALS
OXYGEN SATURATION: 98 % | TEMPERATURE: 98 F | HEART RATE: 69 BPM | RESPIRATION RATE: 14 BRPM | DIASTOLIC BLOOD PRESSURE: 60 MMHG | SYSTOLIC BLOOD PRESSURE: 117 MMHG

## 2025-07-26 DIAGNOSIS — Z72.0 TOBACCO USE: ICD-10-CM

## 2025-07-26 DIAGNOSIS — M79.604 RIGHT LEG PAIN: ICD-10-CM

## 2025-07-26 DIAGNOSIS — M54.31 SCIATICA OF RIGHT SIDE: Primary | ICD-10-CM

## 2025-07-26 PROCEDURE — 63600175 PHARM REV CODE 636 W HCPCS

## 2025-07-26 PROCEDURE — 99284 EMERGENCY DEPT VISIT MOD MDM: CPT | Mod: 25

## 2025-07-26 PROCEDURE — 96372 THER/PROPH/DIAG INJ SC/IM: CPT

## 2025-07-26 PROCEDURE — 25000003 PHARM REV CODE 250

## 2025-07-26 RX ORDER — METHYLPREDNISOLONE 4 MG/1
TABLET ORAL
Qty: 21 EACH | Refills: 0 | Status: SHIPPED | OUTPATIENT
Start: 2025-07-26 | End: 2025-08-16

## 2025-07-26 RX ORDER — DEXAMETHASONE SODIUM PHOSPHATE 4 MG/ML
8 INJECTION, SOLUTION INTRA-ARTICULAR; INTRALESIONAL; INTRAMUSCULAR; INTRAVENOUS; SOFT TISSUE
Status: COMPLETED | OUTPATIENT
Start: 2025-07-26 | End: 2025-07-26

## 2025-07-26 RX ORDER — ORPHENADRINE CITRATE 100 MG/1
100 TABLET, EXTENDED RELEASE ORAL
Status: COMPLETED | OUTPATIENT
Start: 2025-07-26 | End: 2025-07-26

## 2025-07-26 RX ORDER — NAPROXEN 500 MG/1
500 TABLET ORAL 2 TIMES DAILY WITH MEALS
Qty: 30 TABLET | Refills: 0 | Status: SHIPPED | OUTPATIENT
Start: 2025-07-26

## 2025-07-26 RX ORDER — KETOROLAC TROMETHAMINE 30 MG/ML
15 INJECTION, SOLUTION INTRAMUSCULAR; INTRAVENOUS
Status: COMPLETED | OUTPATIENT
Start: 2025-07-26 | End: 2025-07-26

## 2025-07-26 RX ADMIN — DEXAMETHASONE SODIUM PHOSPHATE 8 MG: 4 INJECTION INTRA-ARTICULAR; INTRALESIONAL; INTRAMUSCULAR; INTRAVENOUS; SOFT TISSUE at 10:07

## 2025-07-26 RX ADMIN — ORPHENADRINE CITRATE 100 MG: 100 TABLET, EXTENDED RELEASE ORAL at 10:07

## 2025-07-26 RX ADMIN — KETOROLAC TROMETHAMINE 15 MG: 30 INJECTION, SOLUTION INTRAMUSCULAR; INTRAVENOUS at 10:07

## 2025-07-26 NOTE — DISCHARGE INSTRUCTIONS
You may have pain, weakness, numbness, and tingling that runs from your buttocks down the back of your leg to your feet. This is called sciatica. Your sciatic nerve is a large nerve that starts in your lower back. It runs all the way down the back of your leg. You may have something like a disc or bone spur pressing on this nerve. When something is pressing on or bothering this nerve, it can cause sciatica. This is the medical name for pain, weakness, numbness, or tingling that goes from your buttock down your leg towards your heel. You can have sciatic pain on one side or on both sides. Most of the time, it will get better without surgery.    For sciatica, start taking the Medrol Dosepak as directed and naproxen 500 mg twice a day.  Gabapentin that you are already prescribed may also help with these symptoms.    I have sent in a referral for physical therapy and orthopedics for you to follow up with.    Return to the ER immediately if you have fever, worsening weakness, numbness or tingling between your legs, or going to the bathroom on yourself.

## 2025-07-27 NOTE — ED PROVIDER NOTES
Encounter Date: 7/26/2025       History     Chief Complaint   Patient presents with    Leg Pain     R leg pain x 3 days. Hx of sciatica. Prescribed hydrocodone at [facility] yesterday with no relief.     This is a 69-year-old male with history of sciatica, lumbar back pain, right inguinal hernia s/p repair 16 days ago who presents to the ED with complaints of right leg pain.  Patient describes the pain as tingling and sharp.  He states that it is consistent with how his sciatica usually feels.  Reports that the pain improves with walking and worsens when he is lying down.  He has not taken anything for his symptoms.  States that he went to [facility] yesterday for his symptoms, but has not picked up his pain medicine.  Denies fever, chills, bowel or bladder incontinence, urinary symptoms, chest pain, shortness of breath.    The history is provided by the patient.     Review of patient's allergies indicates:  No Known Allergies  Past Medical History:   Diagnosis Date    Heart attack 2024    Hypertension     Loose, teeth     Pneumonia     Sciatica     right side    Teeth missing     Wears prescription eyeglasses      Past Surgical History:   Procedure Laterality Date    KNEE ARTHROSCOPY      REPAIR, HERNIA, INGUINAL Left 7/10/2025    Procedure: REPAIR, INGUINAL HERNIA, LEFT;  Surgeon: Endy Duran Jr., MD;  Location: [facility];  Service: General;  Laterality: Left;    REPAIR, HERNIA, INGUINAL, WITHOUT HISTORY OF PRIOR REPAIR, AGE 5 YEARS OR OLDER Right 11/22/2023    Procedure: REPAIR, HERNIA, INGUINAL, WITHOUT HISTORY OF PRIOR REPAIR, AGE 5 YEARS OR OLDER;  Surgeon: Endy Duran Jr., MD;  Location: [facility] OR;  Service: General;  Laterality: Right;     No family history on file.  Social History[1]  Review of Systems  10 point ROS performed and negative except as stated in HPI   Physical Exam     Initial Vitals [07/26/25 0924]   BP Pulse Resp Temp SpO2   125/77 82 16 97.5 °F (36.4 °C) 100 %      MAP       --         Physical  Exam    Constitutional: He appears well-developed and well-nourished. He is cooperative.  Non-toxic appearance. He does not appear ill. No distress.   HENT:   Head: Normocephalic and atraumatic.   Eyes: Conjunctivae and lids are normal.   Neck: Trachea normal. Neck supple. No stridor present.   Cardiovascular:  Normal rate and regular rhythm.           Pulmonary/Chest: Effort normal. No tachypnea. No respiratory distress.   Abdominal: Abdomen is soft.   Musculoskeletal:      Cervical back: Neck supple.      Comments: No C, T, L midline spinal tenderness to palpation.  Right lumbar paraspinal muscle spasm with associated tenderness.  + right SLR.  No obvious deformity, swelling, or edema     Neurological: He is alert and oriented to person, place, and time. GCS eye subscore is 4. GCS verbal subscore is 5. GCS motor subscore is 6.   Skin: Skin is warm, dry and intact. No rash noted.   Psychiatric: He has a normal mood and affect. His speech is normal and behavior is normal. Thought content normal.         ED Course   Procedures  Labs Reviewed - No data to display       Imaging Results              X-Ray Lumbar Spine Ap And Lateral (Final result)  Result time 07/26/25 10:48:42      Final result by Tommy Fontenot MD (07/26/25 10:48:42)                   Impression:      As above.      Electronically signed by: Tommy Fontenot  Date:    07/26/2025  Time:    10:48               Narrative:    EXAMINATION:  XR LUMBAR SPINE AP AND LATERAL    CLINICAL HISTORY:  sciatic nerve pain;    TECHNIQUE:  XR LUMBAR SPINE AP AND LATERAL    COMPARISON:  None    FINDINGS:  Four lumbar shaped vertebral bodies-the lowest fully formed was denoted L5 for the purposes of numbering.    Levoscoliosis of the lumbar spine.    No vertebral fractures or collapse noted.    The intervertebral disc spaces are normal in height.  Spondylosis of T12-L1 and L3 - L4.    Surgical clips projecting at the level of L1.  Aortic atherosclerotic disease.                                        Medications   dexAMETHasone injection 8 mg (8 mg Intramuscular Given 7/26/25 1039)   ketorolac injection 15 mg (15 mg Intramuscular Given 7/26/25 1039)   orphenadrine 12 hr tablet 100 mg (100 mg Oral Given 7/26/25 1039)     Medical Decision Making  Urgent evaluation of an afebrile 69-year-old male with right sciatic pain.  Patient appears well nontoxic with stable vital signs.  Positive SLR on right side and symptoms consistent with his usual sciatic pain.  Chart review shows patient seen at [facility] yesterday where ultrasound was negative for DVT.  Patient appears uncomfortable and is slow to change positions however patient ambulates normally and FROM, full flexion and extension of all extremities and back. No midline tenderness, step-offs or deformities of the cervical, thoracic or lumbar spine.  Patient neurovascularly intact, strength 5/5 equal bilaterally. There are no concerning features of bilateral weakness, significant new motor/sensory deficits, saddle anesthesia, or bowel/bladder incontinence to suggest acute cauda equina syndrome. On physical exam, there is no focal midline bony tenderness or evidence of significant trauma to suggest acute fracture or hematoma. There is no fever or erythema/fluctuance to suggest acute diskitis, infection, or abscess. Will administer supportive treatment with anti-inflammatories and muscle relaxants for sciatic pain and strain and reassess.    My differential diagnosis includes, but is not limited to:  Muscular pain, herniated disc, spine fracture, intra-abdominal causes and urinary tract infection, dehydration.    Patient remains febrile and non-toxic appearing. The patient was treated with supportive care  and improved. Will provide short course RX of Medrol at and anti-inflammatories upon D/C.  Will also refer patient to Physical therapy and Orthopedics as this appears to be a chronic issue for him.  Discussed symptomatic and supportive care  instructions, including use of heating pads, stretching and ROM exercises, improving posture, and slowly resuming activity as tolerated. Counseled on need to follow-up with PCP for further evaluation if symptoms persist, including further imaging as an outpatient if symptoms persist.      Amount and/or Complexity of Data Reviewed  Radiology: ordered. Decision-making details documented in ED Course.    Risk  Prescription drug management.               ED Course as of 07/26/25 1956   Sat Jul 26, 2025   1057 X-Ray Lumbar Spine Ap And Lateral  Four lumbar shaped vertebral bodies-the lowest fully formed was denoted L5 for the purposes of numbering.     Levoscoliosis of the lumbar spine.     No vertebral fractures or collapse noted.     The intervertebral disc spaces are normal in height.  Spondylosis of T12-L1 and L3 - L4.     Surgical clips projecting at the level of L1.  Aortic atherosclerotic disease. [HEATHER]      ED Course User Index  [HEATHER] Yelena Aguirre PA-C                      Clinical Impression:  Final diagnoses:  [M79.604] Right leg pain  [M54.31] Sciatica of right side (Primary)  [Z72.0] Tobacco use          ED Disposition Condition    Discharge Stable          ED Prescriptions       Medication Sig Dispense Start Date End Date Auth. Provider    methylPREDNISolone (MEDROL DOSEPACK) 4 mg tablet use as directed 21 each 7/26/2025 8/16/2025 Yelena Aguirre PA-C    naproxen (NAPROSYN) 500 MG tablet Take 1 tablet (500 mg total) by mouth 2 (two) times daily with meals. 30 tablet 7/26/2025 -- Yelena Aguirre PA-C          Follow-up Information    None       Launch MDCalc MDM  MDCalc MDM Module  Jul 26 2025 7:55 PM [Yelena Aguirre]  Data:  - Test/documents: 1 test ordered  Problems: Concern for Low Back Pain  Additional encounter diagnoses: Right leg pain, Sciatica of right side, Tobacco use  Risk: dexAMETHasone injection + 1 more (Rx drug management)             [1]   Social History  Tobacco Use     Smoking status: Every Day     Current packs/day: 0.75     Average packs/day: 0.8 packs/day for 53.6 years (40.2 ttl pk-yrs)     Types: Cigarettes     Start date: 1972     Passive exposure: Past    Smokeless tobacco: Never   Vaping Use    Vaping status: Never Used   Substance Use Topics    Alcohol use: Yes     Alcohol/week: 4.0 standard drinks of alcohol     Types: 4 Cans of beer per week     Comment: Socially on wednesdays    Drug use: Yes     Frequency: 7.0 times per week     Types: Marijuana     Comment: thc smoking        Yelena Aguirre, PAKeshawnC  07/26/25 1956

## 2025-08-01 NOTE — PROGRESS NOTES
DATE: 8/7/2025  PATIENT: Linden Spencer    Supervising Physician: Sy Oconnor M.D.    CHIEF COMPLAINT: low back and right leg pain    HISTORY:  Linden Spencer is a 69 y.o. male here for initial evaluation of low back and right leg pain (Back - 9, Leg - 9).  The pain in the back of the right leg is what bothers him most.  The pain has been present for years, worsening over time. The patient describes the pain as sharp.  The pain is worse with walking and sleeping and improved by nothing. There is positive associated numbness and tingling (L>R). There is positive subjective weakness. Prior treatments have included gabapentin and PT, but no ESIs or surgery.    Pt endorses significant balance problems and trouble walking for years. He says his right leg shakes when he walks    The patient denies myelopathic symptoms such as handwriting changes or difficulty with buttons/coins/keys. Denies perineal paresthesias, bowel/bladder dysfunction.    PAST MEDICAL/SURGICAL HISTORY:  Past Medical History:   Diagnosis Date    Heart attack 2024    Hypertension     Loose, teeth     Pneumonia     Sciatica     right side    Teeth missing     Wears prescription eyeglasses      Past Surgical History:   Procedure Laterality Date    KNEE ARTHROSCOPY      REPAIR, HERNIA, INGUINAL Left 7/10/2025    Procedure: REPAIR, INGUINAL HERNIA, LEFT;  Surgeon: Endy Duran Jr., MD;  [facility];  Service: General;  Laterality: Left;    REPAIR, HERNIA, INGUINAL, WITHOUT HISTORY OF PRIOR REPAIR, AGE 5 YEARS OR OLDER Right 11/22/2023    Procedure: REPAIR, HERNIA, INGUINAL, WITHOUT HISTORY OF PRIOR REPAIR, AGE 5 YEARS OR OLDER;  Surgeon: Endy Duran Jr., MD;  [facility];  Service: General;  Laterality: Right;       Medications:   Medications Ordered Prior to Encounter[1]    Social History: Social History[2]    REVIEW OF SYSTEMS:  Constitution: Negative. Negative for chills, fever and night sweats.   Cardiovascular: Negative for chest pain and  "syncope.   Respiratory: Negative for cough and shortness of breath.   Gastrointestinal: See HPI. Negative for nausea/vomiting. Negative for abdominal pain.  Genitourinary: See HPI. Negative for discoloration or dysuria.  Skin: Negative for dry skin, itching and rash.   Hematologic/Lymphatic: Negative for bleeding problem. Does not bruise/bleed easily.   Musculoskeletal: Negative for falls and muscle weakness.   Neurological: See HPI. No seizures.   Endocrine: Negative for polydipsia, polyphagia and polyuria.   Allergic/Immunologic: Negative for hives and persistent infections.     EXAM:  Ht 5' 11" (1.803 m)   Wt 59 kg (130 lb 1.1 oz)   BMI 18.14 kg/m²     General: The patient is a very pleasant 69 y.o. male in no apparent distress, the patient is oriented to person, place and time.  Psych: Normal mood and affect  HEENT: Vision grossly intact, hearing intact to the spoken word.  Lungs: Respirations unlabored.  Gait: ataxic, antalgic gait  Skin: Dorsal lumbar skin negative for rashes, lesions, hairy patches and surgical scars. There is negative lumbar tenderness to palpation.  Range of motion: Lumbar range of motion is acceptable.  Spinal Balance: Global saggital and coronal spinal balance acceptable, not significant for scoliosis and kyphosis.  Musculoskeletal: No pain with the range of motion of the bilateral hips. No trochanteric tenderness to palpation.  Vascular: Bilateral lower extremities warm and well perfused, dorsalis pedis pulses 2+ bilaterally.  Neurological: 4/5 strength and tone in all major motor groups in the LLE compared to RLE. Normal sensation to light touch in the L2-S1 dermatomes bilaterally.  Deep tendon reflexes symmetric 3+ brisk in the bilateral lower extremities.  Negative Babinski bilaterally. Straight leg raise  positive on right    IMAGING:      Today I personally reviewed AP, Lat and Flex/Ex  upright L-spine films that demonstrate mild degenerative changes      Body mass index is 18.14 " kg/m².    Hemoglobin A1C   Date Value Ref Range Status   02/20/2024 4.8 4.0 - 6.0 % Final           ASSESSMENT/PLAN:    Linden was seen today for low-back pain and leg pain.    Diagnoses and all orders for this visit:    Dorsalgia, unspecified  -     MRI Lumbar Spine Without Contrast; Future    Right leg pain  -     Ambulatory referral/consult to Orthopedics    Sciatica of right side  -     Ambulatory referral/consult to Orthopedics    Ataxia  -     MRI Cervical Spine Without Contrast; Future  -     MRI Thoracic Spine Without Contrast; Future    Other orders  -     gabapentin (NEURONTIN) 300 MG capsule; Take 1 capsule (300 mg total) by mouth 3 (three) times daily.        Today we discussed at length all of the different treatment options including anti-inflammatories, acetaminophen, rest, ice, heat, physical therapy including strengthening and stretching exercises, home exercises, ROM, aerobic conditioning, aqua therapy, other modalities including ultrasound, massage, and dry needling, epidural steroid injections and finally surgical intervention.      Pt presents with chronic lumbar radiculopathy with neuro deficits. Also presents with balance problems concerning for myelopathy. Will obtain MRI entire spine and send gabapentin 300mg TID to pharmacy         [1]   Current Outpatient Medications on File Prior to Visit   Medication Sig Dispense Refill    aspirin (ECOTRIN) 81 MG EC tablet Take 1 tablet by mouth once daily.      atorvastatin (LIPITOR) 80 MG tablet Take 1 tablet by mouth every evening.      ferrous sulfate 325 (65 FE) MG EC tablet Take 325 mg by mouth once daily.      HYDROcodone-acetaminophen (NORCO) 7.5-325 mg per tablet Take 1 tablet by mouth every 6 (six) hours as needed for Pain. 28 tablet 0    isosorbide dinitrate (ISORDIL) 5 MG Tab Take 1 tablet by mouth 3 (three) times daily.      methylPREDNISolone (MEDROL DOSEPACK) 4 mg tablet use as directed 21 each 0    metoprolol succinate (TOPROL-XL) 25 MG 24  hr tablet Take 25 mg by mouth once daily.      naproxen (NAPROSYN) 500 MG tablet Take 1 tablet (500 mg total) by mouth 2 (two) times daily with meals. 30 tablet 0    nitroGLYCERIN (NITROSTAT) 0.4 MG SL tablet Place 0.4 mg under the tongue.      sacubitriL-valsartan (ENTRESTO) 24-26 mg per tablet Take 1 tablet by mouth 2 (two) times daily.      ticagrelor (BRILINTA) 90 mg tablet Take 90 mg by mouth 2 (two) times daily.      [DISCONTINUED] gabapentin (NEURONTIN) 300 MG capsule Take 300 mg by mouth every evening.       No current facility-administered medications on file prior to visit.   [2]   Social History  Socioeconomic History    Marital status: Single   Tobacco Use    Smoking status: Every Day     Current packs/day: 0.75     Average packs/day: 0.8 packs/day for 53.6 years (40.2 ttl pk-yrs)     Types: Cigarettes     Start date: 1972     Passive exposure: Past    Smokeless tobacco: Never   Vaping Use    Vaping status: Never Used   Substance and Sexual Activity    Alcohol use: Yes     Alcohol/week: 4.0 standard drinks of alcohol     Types: 4 Cans of beer per week     Comment: Socially on wednesdays    Drug use: Yes     Frequency: 7.0 times per week     Types: Marijuana     Comment: thc smoking    Sexual activity: Yes     Partners: Female     Social Drivers of Health     Financial Resource Strain: Low Risk  (7/30/2025)    Overall Financial Resource Strain (CARDIA)     Difficulty of Paying Living Expenses: Not very hard   Food Insecurity: No Food Insecurity (7/30/2025)    Hunger Vital Sign     Worried About Running Out of Food in the Last Year: Never true     Ran Out of Food in the Last Year: Never true   Transportation Needs: No Transportation Needs (7/30/2025)    PRAPARE - Transportation     Lack of Transportation (Medical): No     Lack of Transportation (Non-Medical): No   Physical Activity: Unknown (7/30/2025)    Exercise Vital Sign     Days of Exercise per Week: 0 days   Stress: No Stress Concern Present  (7/30/2025)    [employer] - Occupational Stress Questionnaire     Feeling of Stress : Not at all   Housing Stability: Low Risk  (7/30/2025)    Housing Stability Vital Sign     Unable to Pay for Housing in the Last Year: No     Homeless in the Last Year: No

## 2025-08-04 ENCOUNTER — CLINICAL SUPPORT (OUTPATIENT)
Dept: REHABILITATION | Facility: OTHER | Age: 70
End: 2025-08-04
Payer: MEDICARE

## 2025-08-04 DIAGNOSIS — M54.50 ACUTE EXACERBATION OF CHRONIC LOW BACK PAIN: Primary | ICD-10-CM

## 2025-08-04 DIAGNOSIS — R53.1 DECREASED STRENGTH, ENDURANCE, AND MOBILITY: ICD-10-CM

## 2025-08-04 DIAGNOSIS — Z74.09 DECREASED STRENGTH, ENDURANCE, AND MOBILITY: ICD-10-CM

## 2025-08-04 DIAGNOSIS — M54.16 CHRONIC LUMBAR RADICULOPATHY: ICD-10-CM

## 2025-08-04 DIAGNOSIS — R68.89 DECREASED STRENGTH, ENDURANCE, AND MOBILITY: ICD-10-CM

## 2025-08-04 DIAGNOSIS — G89.29 ACUTE EXACERBATION OF CHRONIC LOW BACK PAIN: Primary | ICD-10-CM

## 2025-08-04 PROCEDURE — 97530 THERAPEUTIC ACTIVITIES: CPT | Mod: PN

## 2025-08-04 PROCEDURE — 97162 PT EVAL MOD COMPLEX 30 MIN: CPT | Mod: PN

## 2025-08-07 ENCOUNTER — OFFICE VISIT (OUTPATIENT)
Dept: ORTHOPEDICS | Facility: CLINIC | Age: 70
End: 2025-08-07
Payer: MEDICARE

## 2025-08-07 VITALS — HEIGHT: 71 IN | WEIGHT: 130.06 LBS | BODY MASS INDEX: 18.21 KG/M2

## 2025-08-07 DIAGNOSIS — R27.0 ATAXIA: ICD-10-CM

## 2025-08-07 DIAGNOSIS — M79.604 RIGHT LEG PAIN: ICD-10-CM

## 2025-08-07 DIAGNOSIS — M54.9 DORSALGIA, UNSPECIFIED: Primary | ICD-10-CM

## 2025-08-07 DIAGNOSIS — M54.31 SCIATICA OF RIGHT SIDE: ICD-10-CM

## 2025-08-07 PROCEDURE — 99204 OFFICE O/P NEW MOD 45 MIN: CPT | Mod: S$GLB,,, | Performed by: ORTHOPAEDIC SURGERY

## 2025-08-07 PROCEDURE — 1159F MED LIST DOCD IN RCRD: CPT | Mod: CPTII,S$GLB,, | Performed by: ORTHOPAEDIC SURGERY

## 2025-08-07 PROCEDURE — 3288F FALL RISK ASSESSMENT DOCD: CPT | Mod: CPTII,S$GLB,, | Performed by: ORTHOPAEDIC SURGERY

## 2025-08-07 PROCEDURE — 3008F BODY MASS INDEX DOCD: CPT | Mod: CPTII,S$GLB,, | Performed by: ORTHOPAEDIC SURGERY

## 2025-08-07 PROCEDURE — 99999 PR PBB SHADOW E&M-EST. PATIENT-LVL III: CPT | Mod: PBBFAC,,, | Performed by: ORTHOPAEDIC SURGERY

## 2025-08-07 PROCEDURE — 1125F AMNT PAIN NOTED PAIN PRSNT: CPT | Mod: CPTII,S$GLB,, | Performed by: ORTHOPAEDIC SURGERY

## 2025-08-07 PROCEDURE — 4010F ACE/ARB THERAPY RXD/TAKEN: CPT | Mod: CPTII,S$GLB,, | Performed by: ORTHOPAEDIC SURGERY

## 2025-08-07 PROCEDURE — 1101F PT FALLS ASSESS-DOCD LE1/YR: CPT | Mod: CPTII,S$GLB,, | Performed by: ORTHOPAEDIC SURGERY

## 2025-08-07 RX ORDER — GABAPENTIN 300 MG/1
300 CAPSULE ORAL 3 TIMES DAILY
Qty: 90 CAPSULE | Refills: 11 | Status: SHIPPED | OUTPATIENT
Start: 2025-08-07 | End: 2026-08-07

## 2025-08-11 ENCOUNTER — CLINICAL SUPPORT (OUTPATIENT)
Dept: REHABILITATION | Facility: OTHER | Age: 70
End: 2025-08-11
Payer: MEDICARE

## 2025-08-11 DIAGNOSIS — R53.1 DECREASED STRENGTH, ENDURANCE, AND MOBILITY: ICD-10-CM

## 2025-08-11 DIAGNOSIS — M54.16 CHRONIC LUMBAR RADICULOPATHY: ICD-10-CM

## 2025-08-11 DIAGNOSIS — Z74.09 DECREASED STRENGTH, ENDURANCE, AND MOBILITY: ICD-10-CM

## 2025-08-11 DIAGNOSIS — G89.29 ACUTE EXACERBATION OF CHRONIC LOW BACK PAIN: Primary | ICD-10-CM

## 2025-08-11 DIAGNOSIS — M54.50 ACUTE EXACERBATION OF CHRONIC LOW BACK PAIN: Primary | ICD-10-CM

## 2025-08-11 DIAGNOSIS — R68.89 DECREASED STRENGTH, ENDURANCE, AND MOBILITY: ICD-10-CM

## 2025-08-11 PROBLEM — M54.41 ACUTE RIGHT-SIDED LOW BACK PAIN WITH RIGHT-SIDED SCIATICA: Status: RESOLVED | Noted: 2018-09-05 | Resolved: 2025-08-11

## 2025-08-11 PROBLEM — M79.604 PAIN IN RIGHT LEG: Status: RESOLVED | Noted: 2023-09-01 | Resolved: 2025-08-11

## 2025-08-11 PROBLEM — M62.89 MUSCLE TIGHTNESS: Status: RESOLVED | Noted: 2018-09-05 | Resolved: 2025-08-11

## 2025-08-11 PROCEDURE — 97110 THERAPEUTIC EXERCISES: CPT | Mod: PN,CQ

## 2025-08-12 ENCOUNTER — HOSPITAL ENCOUNTER (OUTPATIENT)
Dept: RADIOLOGY | Facility: OTHER | Age: 70
Discharge: HOME | End: 2025-08-12
Attending: ORTHOPAEDIC SURGERY
Payer: MEDICARE

## 2025-08-12 DIAGNOSIS — R27.0 ATAXIA: ICD-10-CM

## 2025-08-12 DIAGNOSIS — M54.9 DORSALGIA, UNSPECIFIED: ICD-10-CM

## 2025-08-12 PROCEDURE — 72146 MRI CHEST SPINE W/O DYE: CPT | Mod: 26,,, | Performed by: INTERNAL MEDICINE

## 2025-08-12 PROCEDURE — 72148 MRI LUMBAR SPINE W/O DYE: CPT | Mod: TC

## 2025-08-12 PROCEDURE — 72141 MRI NECK SPINE W/O DYE: CPT | Mod: TC

## 2025-08-12 PROCEDURE — 72146 MRI CHEST SPINE W/O DYE: CPT | Mod: TC

## 2025-08-12 PROCEDURE — 72141 MRI NECK SPINE W/O DYE: CPT | Mod: 26,,, | Performed by: INTERNAL MEDICINE

## 2025-08-12 PROCEDURE — 72148 MRI LUMBAR SPINE W/O DYE: CPT | Mod: 26,,, | Performed by: INTERNAL MEDICINE

## 2025-08-13 ENCOUNTER — TELEPHONE (OUTPATIENT)
Dept: ORTHOPEDICS | Facility: CLINIC | Age: 70
End: 2025-08-13
Payer: MEDICARE

## 2025-08-13 ENCOUNTER — CLINICAL SUPPORT (OUTPATIENT)
Dept: REHABILITATION | Facility: OTHER | Age: 70
End: 2025-08-13
Payer: MEDICARE

## 2025-08-13 ENCOUNTER — OFFICE VISIT (OUTPATIENT)
Dept: ORTHOPEDICS | Facility: CLINIC | Age: 70
End: 2025-08-13
Payer: MEDICARE

## 2025-08-13 DIAGNOSIS — M54.31 SCIATICA OF RIGHT SIDE: Primary | ICD-10-CM

## 2025-08-13 DIAGNOSIS — M54.50 ACUTE EXACERBATION OF CHRONIC LOW BACK PAIN: Primary | ICD-10-CM

## 2025-08-13 DIAGNOSIS — G95.9 CERVICAL MYELOPATHY: ICD-10-CM

## 2025-08-13 DIAGNOSIS — Z74.09 DECREASED STRENGTH, ENDURANCE, AND MOBILITY: ICD-10-CM

## 2025-08-13 DIAGNOSIS — R68.89 DECREASED STRENGTH, ENDURANCE, AND MOBILITY: ICD-10-CM

## 2025-08-13 DIAGNOSIS — R53.1 DECREASED STRENGTH, ENDURANCE, AND MOBILITY: ICD-10-CM

## 2025-08-13 DIAGNOSIS — M54.16 CHRONIC LUMBAR RADICULOPATHY: ICD-10-CM

## 2025-08-13 DIAGNOSIS — G89.29 ACUTE EXACERBATION OF CHRONIC LOW BACK PAIN: Primary | ICD-10-CM

## 2025-08-13 PROCEDURE — 97112 NEUROMUSCULAR REEDUCATION: CPT | Mod: PN | Performed by: PHYSICAL THERAPIST

## 2025-08-13 PROCEDURE — 99499 UNLISTED E&M SERVICE: CPT | Mod: 95,,, | Performed by: ORTHOPAEDIC SURGERY

## 2025-08-14 DIAGNOSIS — G95.9 CERVICAL MYELOPATHY: Primary | ICD-10-CM

## 2025-08-14 DIAGNOSIS — M54.16 LUMBAR RADICULOPATHY: Primary | ICD-10-CM

## 2025-08-14 DIAGNOSIS — M54.31 SCIATICA OF RIGHT SIDE: ICD-10-CM

## 2025-08-18 ENCOUNTER — CLINICAL SUPPORT (OUTPATIENT)
Dept: REHABILITATION | Facility: OTHER | Age: 70
End: 2025-08-18
Payer: MEDICARE

## 2025-08-18 DIAGNOSIS — M54.50 ACUTE EXACERBATION OF CHRONIC LOW BACK PAIN: Primary | ICD-10-CM

## 2025-08-18 DIAGNOSIS — M54.16 CHRONIC LUMBAR RADICULOPATHY: ICD-10-CM

## 2025-08-18 DIAGNOSIS — G89.29 ACUTE EXACERBATION OF CHRONIC LOW BACK PAIN: Primary | ICD-10-CM

## 2025-08-18 PROBLEM — Z74.09 DECREASED STRENGTH, ENDURANCE, AND MOBILITY: Status: RESOLVED | Noted: 2025-08-11 | Resolved: 2025-08-18

## 2025-08-18 PROBLEM — R53.1 DECREASED STRENGTH, ENDURANCE, AND MOBILITY: Status: RESOLVED | Noted: 2025-08-11 | Resolved: 2025-08-18

## 2025-08-18 PROBLEM — R68.89 DECREASED STRENGTH, ENDURANCE, AND MOBILITY: Status: RESOLVED | Noted: 2025-08-11 | Resolved: 2025-08-18

## 2025-08-18 PROCEDURE — 97112 NEUROMUSCULAR REEDUCATION: CPT | Mod: PN

## 2025-08-18 PROCEDURE — 97530 THERAPEUTIC ACTIVITIES: CPT | Mod: PN

## 2025-08-20 ENCOUNTER — CLINICAL SUPPORT (OUTPATIENT)
Dept: REHABILITATION | Facility: OTHER | Age: 70
End: 2025-08-20
Payer: MEDICARE

## 2025-08-20 ENCOUNTER — CLINICAL SUPPORT (OUTPATIENT)
Dept: SMOKING CESSATION | Facility: CLINIC | Age: 70
End: 2025-08-20
Payer: MEDICARE

## 2025-08-20 DIAGNOSIS — M54.16 CHRONIC LUMBAR RADICULOPATHY: ICD-10-CM

## 2025-08-20 DIAGNOSIS — F17.200 NICOTINE DEPENDENCE: Primary | ICD-10-CM

## 2025-08-20 DIAGNOSIS — M54.50 ACUTE EXACERBATION OF CHRONIC LOW BACK PAIN: Primary | ICD-10-CM

## 2025-08-20 DIAGNOSIS — G89.29 ACUTE EXACERBATION OF CHRONIC LOW BACK PAIN: Primary | ICD-10-CM

## 2025-08-20 PROCEDURE — 99999 PR PBB SHADOW E&M-EST. PATIENT-LVL I: CPT | Mod: PBBFAC,,,

## 2025-08-20 PROCEDURE — 99404 PREV MED CNSL INDIV APPRX 60: CPT | Mod: S$GLB,,,

## 2025-08-20 PROCEDURE — 97530 THERAPEUTIC ACTIVITIES: CPT | Mod: PN | Performed by: PHYSICAL THERAPIST

## 2025-08-20 PROCEDURE — 97112 NEUROMUSCULAR REEDUCATION: CPT | Mod: PN | Performed by: PHYSICAL THERAPIST

## 2025-08-20 RX ORDER — VARENICLINE TARTRATE 0.5 (11)-1
KIT ORAL
Qty: 53 EACH | Refills: 0 | Status: SHIPPED | OUTPATIENT
Start: 2025-08-20

## 2025-08-20 RX ORDER — DM/P-EPHED/ACETAMINOPH/DOXYLAM 30-7.5/3
2 LIQUID (ML) ORAL
Qty: 72 LOZENGE | Refills: 0 | Status: SHIPPED | OUTPATIENT
Start: 2025-08-20

## 2025-08-27 ENCOUNTER — HOSPITAL ENCOUNTER (OUTPATIENT)
Facility: OTHER | Age: 70
Discharge: HOME | End: 2025-08-27
Attending: ANESTHESIOLOGY | Admitting: ANESTHESIOLOGY
Payer: MEDICARE

## 2025-08-27 VITALS
TEMPERATURE: 98 F | SYSTOLIC BLOOD PRESSURE: 145 MMHG | WEIGHT: 142 LBS | HEART RATE: 62 BPM | BODY MASS INDEX: 19.88 KG/M2 | DIASTOLIC BLOOD PRESSURE: 67 MMHG | HEIGHT: 71 IN | RESPIRATION RATE: 18 BRPM | OXYGEN SATURATION: 99 %

## 2025-08-27 DIAGNOSIS — M54.16 LUMBAR RADICULOPATHY: Primary | ICD-10-CM

## 2025-08-27 PROCEDURE — 64484 NJX AA&/STRD TFRM EPI L/S EA: CPT | Mod: RT | Performed by: ANESTHESIOLOGY

## 2025-08-27 PROCEDURE — 63600175 PHARM REV CODE 636 W HCPCS: Performed by: ANESTHESIOLOGY

## 2025-08-27 PROCEDURE — 64484 NJX AA&/STRD TFRM EPI L/S EA: CPT | Mod: RT,,, | Performed by: ANESTHESIOLOGY

## 2025-08-27 PROCEDURE — 64483 NJX AA&/STRD TFRM EPI L/S 1: CPT | Mod: RT | Performed by: ANESTHESIOLOGY

## 2025-08-27 PROCEDURE — 99152 MOD SED SAME PHYS/QHP 5/>YRS: CPT | Mod: ,,, | Performed by: ANESTHESIOLOGY

## 2025-08-27 PROCEDURE — 99152 MOD SED SAME PHYS/QHP 5/>YRS: CPT | Performed by: ANESTHESIOLOGY

## 2025-08-27 PROCEDURE — 25500020 PHARM REV CODE 255: Performed by: ANESTHESIOLOGY

## 2025-08-27 PROCEDURE — 64483 NJX AA&/STRD TFRM EPI L/S 1: CPT | Mod: RT,,, | Performed by: ANESTHESIOLOGY

## 2025-08-27 RX ORDER — LIDOCAINE HYDROCHLORIDE 10 MG/ML
INJECTION, SOLUTION EPIDURAL; INFILTRATION; INTRACAUDAL; PERINEURAL
Status: DISCONTINUED | OUTPATIENT
Start: 2025-08-27 | End: 2025-08-27 | Stop reason: HOSPADM

## 2025-08-27 RX ORDER — LIDOCAINE HYDROCHLORIDE 20 MG/ML
INJECTION, SOLUTION INFILTRATION; PERINEURAL
Status: DISCONTINUED | OUTPATIENT
Start: 2025-08-27 | End: 2025-08-27 | Stop reason: HOSPADM

## 2025-08-27 RX ORDER — FENTANYL CITRATE 50 UG/ML
INJECTION, SOLUTION INTRAMUSCULAR; INTRAVENOUS
Status: DISCONTINUED | OUTPATIENT
Start: 2025-08-27 | End: 2025-08-27 | Stop reason: HOSPADM

## 2025-08-27 RX ORDER — DEXAMETHASONE SODIUM PHOSPHATE 10 MG/ML
INJECTION, SOLUTION INTRA-ARTICULAR; INTRALESIONAL; INTRAMUSCULAR; INTRAVENOUS; SOFT TISSUE
Status: DISCONTINUED | OUTPATIENT
Start: 2025-08-27 | End: 2025-08-27 | Stop reason: HOSPADM

## 2025-08-27 RX ORDER — SODIUM CHLORIDE 9 MG/ML
INJECTION, SOLUTION INTRAVENOUS CONTINUOUS
Status: DISCONTINUED | OUTPATIENT
Start: 2025-08-27 | End: 2025-08-27 | Stop reason: HOSPADM

## 2025-08-27 RX ORDER — MIDAZOLAM HYDROCHLORIDE 1 MG/ML
INJECTION INTRAMUSCULAR; INTRAVENOUS
Status: DISCONTINUED | OUTPATIENT
Start: 2025-08-27 | End: 2025-08-27 | Stop reason: HOSPADM

## (undated) DEVICE — DRAIN PENROSE XRAY 12 X 1/4 ST

## (undated) DEVICE — SYS CLSR DERMABOND PRINEO 22CM

## (undated) DEVICE — ELECTRODE REM PLYHSV RETURN 9

## (undated) DEVICE — GLOVE SENSICARE PI MICRO 6.5

## (undated) DEVICE — SUT ETHIBOND 0 CR/CT-2 8-18

## (undated) DEVICE — NDL ECLIPSE SAFETY 23G 1.5IN

## (undated) DEVICE — SYR B-D DISP CONTROL 10CC100/C

## (undated) DEVICE — DRAIN PENRS SIL STRL .25X18IN

## (undated) DEVICE — NDL HYPO REG 25G X 1 1/2

## (undated) DEVICE — SUT VICRYL PLUS 3-0 SH 18IN

## (undated) DEVICE — PENCIL ELECTROSURG HOLST W/BLD

## (undated) DEVICE — DECANTER 6 VIAL

## (undated) DEVICE — TOWEL OR DISP STRL BLUE 4/PK

## (undated) DEVICE — SYS CLSR DERMABOND PRINEO 42CM

## (undated) DEVICE — SOL POVIDONE SCRUB IODINE 4 OZ

## (undated) DEVICE — DRAPE LAP T SHT W/ INSTR PAD

## (undated) DEVICE — APPLICATOR CHLORAPREP ORN 26ML

## (undated) DEVICE — ELECTRODE BLADE TEFLON 6

## (undated) DEVICE — GOWN ORBIS LVL 4 XXL 49IN

## (undated) DEVICE — GLOVE SENSICARE PI MICRO 8

## (undated) DEVICE — SUT VICRYL PLUS 3-0 18IN

## (undated) DEVICE — ELECTRODE BLD EXT INSUL 1

## (undated) DEVICE — GOWN SMART IMP BREATHABLE XXLG

## (undated) DEVICE — Device

## (undated) DEVICE — SUT MCRYL PLUS 4-0 PS2 27IN